# Patient Record
Sex: MALE | Race: BLACK OR AFRICAN AMERICAN | Employment: OTHER | URBAN - METROPOLITAN AREA
[De-identification: names, ages, dates, MRNs, and addresses within clinical notes are randomized per-mention and may not be internally consistent; named-entity substitution may affect disease eponyms.]

---

## 2017-04-13 ENCOUNTER — OFFICE VISIT (OUTPATIENT)
Dept: CARDIOLOGY CLINIC | Age: 67
End: 2017-04-13

## 2017-04-13 VITALS
HEART RATE: 62 BPM | BODY MASS INDEX: 30.93 KG/M2 | DIASTOLIC BLOOD PRESSURE: 65 MMHG | SYSTOLIC BLOOD PRESSURE: 131 MMHG | HEIGHT: 76 IN | WEIGHT: 254 LBS

## 2017-04-13 DIAGNOSIS — E78.00 PURE HYPERCHOLESTEROLEMIA: ICD-10-CM

## 2017-04-13 DIAGNOSIS — Z95.5 HISTORY OF CORONARY ARTERY STENT PLACEMENT: ICD-10-CM

## 2017-04-13 DIAGNOSIS — I25.10 ATHEROSCLEROSIS OF NATIVE CORONARY ARTERY OF NATIVE HEART WITHOUT ANGINA PECTORIS: Primary | ICD-10-CM

## 2017-04-13 DIAGNOSIS — I10 ESSENTIAL HYPERTENSION, BENIGN: ICD-10-CM

## 2017-04-13 DIAGNOSIS — R60.9 EDEMA, UNSPECIFIED TYPE: ICD-10-CM

## 2017-04-13 DIAGNOSIS — I25.2 OLD MYOCARDIAL INFARCTION: ICD-10-CM

## 2017-04-13 RX ORDER — PRAVASTATIN SODIUM 40 MG/1
40 TABLET ORAL
COMMUNITY
End: 2018-02-02 | Stop reason: ALTCHOICE

## 2017-04-13 NOTE — LETTER
Brittanie Diggs. 
1950 2017 Dear Brianna Garcia MD 
 
I had the pleasure of evaluating  Mr. Danielle Winchester in office today. Below are the relevant portions of my assessment and plan of care. ICD-10-CM ICD-9-CM 1. Atherosclerosis of native coronary artery of native heart without angina pectoris I25.10 414.01   
  no angina;   
2. Essential hypertension, benign I10 401.1  controlled; 10/16 high today 3. Pure hypercholesterolemia E78.00 272.0 pravastatin (PRAVACHOL) 40 mg tablet 3/17 LDL41; 9/15 [id number];   
4. Old myocardial infarction I25.2 412 5. History of coronary artery stent placement Z95.5 V45.82   
 2003 RCA 6. Edema, unspecified type R60.9 782.3 Comp Stocking, Knee,Long,Small (T.E.D. KNEE LENGTH-M-LONG) misc 2017 likely venous insufficiency. Try knee-high teds Current Outpatient Prescriptions Medication Sig Dispense Refill  pravastatin (PRAVACHOL) 40 mg tablet Take 40 mg by mouth nightly.  Comp Stocking, Knee,Long,Small (T.E.D. KNEE LENGTH-M-LONG) misc 2 Each by Does Not Apply route daily. 2 Each 0  
 amLODIPine-benazepril (LOTREL) 10-40 mg per capsule Take 1 Cap by mouth daily. 30 Cap 6  hydrochlorothiazide (HYDRODIURIL) 12.5 mg tablet Take 1 Tab by mouth every Monday and Thursday. 30 Tab 3  
 loratadine (CLARITIN) 10 mg tablet Take 10 mg by mouth.  OMEGA-3 FATTY ACIDS/FISH OIL (OMEGA 3 FISH OIL PO) Take  by mouth.  GARLIC PO Take 00,960 mg by mouth daily.  metFORMIN (GLUCOPHAGE) 1,000 mg tablet Take 1,000 mg by mouth two (2) times daily (with meals).  buffered aspirin (BUFFERIN) 325 mg tablet Take 325 mg by mouth daily. Orders Placed This Encounter  pravastatin (PRAVACHOL) 40 mg tablet Sig: Take 40 mg by mouth nightly.  Comp Stocking, Knee,Long,Small (T.E.D. KNEE LENGTH-M-LONG) misc Si Each by Does Not Apply route daily. Dispense:  2 Each   Refill:  0  
 
 If you have questions, please do not hesitate to call me. I look forward to following Mr. Katyh Siddiqui along with you. Sincerely, Claudette Rosales MD

## 2017-04-13 NOTE — MR AVS SNAPSHOT
Visit Information Date & Time Provider Department Dept. Phone Encounter #  
 4/13/2017  8:15 AM Danny Her MD [facility] [address] [place] [address] [id number] Follow-up Instructions Return in about 1 year (around 4/13/2018), or if symptoms worsen or fail to improve. Your Appointments 4/12/2018  8:00 AM  
ESTABLISHED PATIENT with Danny Her MD  
[facility]) Appt Note: 1 yr  
 [address] [facility], [address] [facility] [address]  
[address], [address] [place] [address] Upcoming Health Maintenance Date Due Hepatitis C Screening 1950 HEMOGLOBIN A1C Q6M 1950 LIPID PANEL Q1 1950 FOOT EXAM Q1 3/9/1961 MICROALBUMIN Q1 3/9/1961 EYE EXAM RETINAL OR DILATED Q1 3/9/1961 DTaP/Tdap/Td series (1 - Tdap) 3/9/1972 FOBT Q 1 YEAR AGE 50-75 3/9/2001 ZOSTER VACCINE AGE 60> 3/9/2011 GLAUCOMA SCREENING Q2Y 3/9/2016 Pneumococcal 65+ Low/Medium Risk (1 of 2 - PCV13) 3/9/2016 MEDICARE YEARLY EXAM 3/9/2016 INFLUENZA AGE 9 TO ADULT 8/1/2016 Allergies as of 4/13/2017  Review Complete On: 4/13/2017 By: Danny Her MD  
  
 Severity Noted Reaction Type Reactions Plavix [Clopidogrel]    Unknown (comments) Current Immunizations  Never Reviewed No immunizations on file. Not reviewed this visit You Were Diagnosed With   
  
 Codes Comments Atherosclerosis of native coronary artery of native heart without angina pectoris    -  Primary ICD-10-CM: I25.10 ICD-9-CM: 414.01 4/17 no angina;   
 Essential hypertension, benign     ICD-10-CM: I10 
ICD-9-CM: 401.1 4/17 controlled; 10/16 high today Pure hypercholesterolemia     ICD-10-CM: E78.00 ICD-9-CM: 272.0 3/17 LDL41; 9/15 RIU661; Old myocardial infarction     ICD-10-CM: I25.2 ICD-9-CM: 923 History of coronary artery stent placement     ICD-10-CM: Z95.5 ICD-9-CM: V45.82  RCA Edema, unspecified type     ICD-10-CM: R60.9 ICD-9-CM: 782.3 2017 likely venous insufficiency. Try knee-high teds Vitals BP Pulse Height(growth percentile) Weight(growth percentile) BMI Smoking Status 131/65 62 6' 4\" (1.93 m) 254 lb (115.2 kg) 30.92 kg/m2 Never Smoker Vitals History BMI and BSA Data Body Mass Index Body Surface Area 30.92 kg/m 2 2.49 m 2 Preferred Pharmacy Pharmacy Name Phone [facility]  [facility], [address] [phone] Your Updated Medication List  
  
   
This list is accurate as of: 17  9:21 AM.  Always use your most recent med list. amLODIPine-benazepril 10-40 mg per capsule Commonly known as:  Monda Standing Take 1 Cap by mouth daily. buffered aspirin 325 mg tablet Commonly known as:  BUFFERIN Take 325 mg by mouth daily. CLARITIN 10 mg tablet Generic drug:  loratadine Take 10 mg by mouth. Comp Stocking,  [facility] Commonly known as:  T.E.D. KNEE LENGTH-M-LONG  
2 Each by Does Not Apply route daily. GARLIC PO Take 15,000 mg by mouth daily. hydroCHLOROthiazide 12.5 mg tablet Commonly known as:  HYDRODIURIL Take 1 Tab by mouth every Monday and Thursday. metFORMIN 1,000 mg tablet Commonly known as:  GLUCOPHAGE Take 1,000 mg by mouth two (2) times daily (with meals). OMEGA 3 FISH OIL PO Take  by mouth. PRAVACHOL 40 mg tablet Generic drug:  pravastatin Take 40 mg by mouth nightly. Prescriptions Sent to Pharmacy Refills Comp Stocking, Knee,Long,Small (T.E.D. KNEE LENGTH-M-LONG) misc 0 Si Each by Does Not Apply route daily. Class: Normal  
 Pharmacy: [facility]  [facility], [address] Ph #: [phone] Route: Does Not Apply Follow-up Instructions  Return in about 1 year (around 2018), or if symptoms worsen or fail to improve. Patient Instructions Medications Discontinued During This Encounter Medication Reason  atorvastatin (LIPITOR) 40 mg tablet Alternate Therapy Orders Placed This Encounter  Comp Stocking, Knee,Long,Small (T.E.D. KNEE LENGTH-M-LONG) misc Si Each by Does Not Apply route daily. Dispense:  2 Each Refill:  0 Learning About Coronary Artery Disease (CAD) What is coronary artery disease? Coronary artery disease (CAD) occurs when plaque builds up in the arteries that bring oxygen-rich blood to your heart. Plaque is a fatty substance made of cholesterol, calcium, and other substances in the blood. This process is called hardening of the arteries, or atherosclerosis. What happens when you have coronary artery disease? · Plaque may narrow the coronary arteries. Narrowed arteries cause poor blood flow. This can lead to angina symptoms such as chest pain or discomfort. If blood flow is completely blocked, you could have a heart attack. · You can slow CAD and reduce the risk of future problems by making changes in your lifestyle. These include quitting smoking and eating heart-healthy foods. · Treatments for CAD, along with changes in your lifestyle, can help you live a longer and healthier life. How can you prevent coronary artery disease? · Do not smoke. It may be the best thing you can do to prevent heart disease. If you need help quitting, talk to your doctor about stop-smoking programs and medicines. These can increase your chances of quitting for good. · Be active. Get at least 30 minutes of exercise on most days of the week. Walking is a good choice. You also may want to do other activities, such as running, swimming, cycling, or playing tennis or team sports. · Eat heart-healthy foods. Eat more fruits and vegetables and less foods that contain saturated and trans fats. Limit alcohol, sodium, and sweets. · Stay at a healthy weight. Lose weight if you need to. · Manage other health problems such as diabetes, high blood pressure, and high cholesterol. · Manage stress. Stress can hurt your heart. To keep stress low, talk about your problems and feelings. Don't keep your feelings hidden. · If you have talked about it with your doctor, take a low-dose aspirin every day. Aspirin can help certain people lower their risk of a heart attack or stroke. But taking aspirin isn't right for everyone, because it can cause serious bleeding. Do not start taking daily aspirin unless your doctor knows about it. How is coronary artery disease treated? · Your doctor will suggest that you make lifestyle changes. For example, your doctor may ask you to eat healthy foods, quit smoking, lose extra weight, and be more active. · You will have to take medicines. · Your doctor may suggest a procedure to open narrowed or blocked arteries. This is called angioplasty. Or your doctor may suggest using healthy blood vessels to create detours around narrowed or blocked arteries. This is called bypass surgery. Follow-up care is a key part of your treatment and safety. Be sure to make and go to all appointments, and call your doctor if you are having problems. It's also a good idea to know your test results and keep a list of the medicines you take. Where can you learn more? Go to http://ave-kristofer.info/. Enter [phone] in the search box to learn more about \"Learning About Coronary Artery Disease (CAD). \" Current as of: January 27, 2016 Content Version: 11.2 © 7515-0553 [employer]. Care instructions adapted under license by [employer] (which disclaims liability or warranty for this information). If you have questions about a medical condition or this instruction, always ask your healthcare professional. [address] any warranty or liability for your use of this information. Introducing [facility]! Lesly Meaghan introduces [employer] patient portal. Now you can access parts of your medical record, email your doctor's office, and request medication refills online. 1. In your internet browser, go to [email or web address] 2. Click on the First Time User? Click Here link in the Sign In box. You will see the New Member Sign Up page. 3. Enter your [employer] Access Code exactly as it appears below. You will not need to use this code after youve completed the sign-up process. If you do not sign up before the expiration date, you must request a new code. · [employer] Access Code: [email or web address] Expires: 7/12/2017  8:46 AM 
 
4. Enter the last four digits of your Social Security Number (xxxx) and Date of Birth (mm/dd/yyyy) as indicated and click Submit. You will be taken to the next sign-up page. 5. Create a [employer] ID. This will be your [employer] login ID and cannot be changed, so think of one that is secure and easy to remember. 6. Create a [employer] password. You can change your password at any time. 7. Enter your Password Reset Question and Answer. This can be used at a later time if you forget your password. 8. Enter your e-mail address. You will receive e-mail notification when new information is available in [address] 9. Click Sign Up. You can now view and download portions of your medical record. 10. Click the Download Summary menu link to download a portable copy of your medical information. If you have questions, please visit the Frequently Asked Questions section of the [employer] website. Remember, [employer] is NOT to be used for urgent needs. For medical emergencies, dial 911. Now available from your iPhone and Android! Please provide this summary of care documentation to your next provider. Your primary care clinician is listed as Jem Cesar. If you have any questions after today's visit, please call [phone].

## 2017-04-13 NOTE — PROGRESS NOTES
1. Have you been to the ER, urgent care clinic since your last visit? Hospitalized since your last visit? NO    2. Have you seen or consulted any other health care providers outside of the [address] [place] [address] since your last visit? Include any pap smears or colon screening. No     3. Since your last visit, have you had any of the following symptoms? None    4. Have you had any blood work, X-rays or cardiac testing? Lipid and LFT scanned in media 7/3/16    5. Where do you normally have your labs drawn?  [facility]     6. Do you need any refills today?  no      Medications confirmed by patient verbally

## 2017-04-13 NOTE — PROGRESS NOTES
HISTORY OF PRESENT ILLNESS  Sameer Pina is a 78 y.o. male. HPI Comments: Follow-up of CAD, hypertension, hyperlipidemia and obesity. History of old PCI. Patient denies significant chest pain, SOB, palpitations, edema, dizziness      Hypertension   The history is provided by the medical records. This is a chronic problem. Cholesterol Problem   The history is provided by the medical records. This is a chronic problem. Review of Systems   Constitutional: Negative for chills, fever, malaise/fatigue and weight loss. HENT: Negative for nosebleeds. Eyes: Negative for discharge. Respiratory: Negative for cough and wheezing. Cardiovascular: Negative for palpitations, orthopnea, claudication, leg swelling and PND. Gastrointestinal: Negative for diarrhea, nausea and vomiting. Genitourinary: Negative for dysuria and hematuria. Musculoskeletal: Negative for joint pain. Skin: Negative for rash. Neurological: Negative for dizziness, seizures and loss of consciousness. Endo/Heme/Allergies: Negative for polydipsia. Does not bruise/bleed easily. Psychiatric/Behavioral: Negative for depression and substance abuse. The patient does not have insomnia.       Allergies   Allergen Reactions    Plavix [Clopidogrel] Unknown (comments)       Past Medical History:   Diagnosis Date    CAD (coronary artery disease)     Coronary atherosclerosis of native coronary artery     Essential hypertension     Essential hypertension, benign     Obesity, unspecified     Other and unspecified hyperlipidemia     Type II or unspecified type diabetes mellitus without mention of complication, not stated as uncontrolled        Family History   Problem Relation Age of Onset    Heart Attack Neg Hx     Stroke Neg Hx        Social History   Substance Use Topics    Smoking status: Never Smoker    Smokeless tobacco: Never Used    Alcohol use No        Current Outpatient Prescriptions   Medication Sig    pravastatin (PRAVACHOL) 40 mg tablet Take 40 mg by mouth nightly.  amLODIPine-benazepril (LOTREL) 10-40 mg per capsule Take 1 Cap by mouth daily.  hydrochlorothiazide (HYDRODIURIL) 12.5 mg tablet Take 1 Tab by mouth every Monday and Thursday.  loratadine (CLARITIN) 10 mg tablet Take 10 mg by mouth.  OMEGA-3 FATTY ACIDS/FISH OIL (OMEGA 3 FISH OIL PO) Take  by mouth.  GARLIC PO Take 83,217 mg by mouth daily.  metFORMIN (GLUCOPHAGE) 1,000 mg tablet Take 1,000 mg by mouth two (2) times daily (with meals).  buffered aspirin (BUFFERIN) 325 mg tablet Take 325 mg by mouth daily. No current facility-administered medications for this visit. Past Surgical History:   Procedure Laterality Date    CARDIAC SURG PROCEDURE UNLIST      HX CORONARY STENT PLACEMENT  2003       Diagnostic Studies:  CARDIOLOGY STUDIES 4/25/2014 12/9/2008   Treadmill Stress Test Result - WNL   Exercise Nuclear Stress Test Result nl scan, 50%EF -       Visit Vitals    /65    Pulse 62    Ht 6' 4\" (1.93 m)    Wt 115.2 kg (254 lb)    BMI 30.92 kg/m2       Mr. Olinda Jimenez has a reminder for a \"due or due soon\" health maintenance. I have asked that he contact his primary care provider for follow-up on this health maintenance. Physical Exam   Constitutional: He is oriented to person, place, and time. He appears well-developed and well-nourished. No distress. HENT:   Head: Normocephalic and atraumatic. Mouth/Throat: Normal dentition. Eyes: Right eye exhibits no discharge. Left eye exhibits no discharge. No scleral icterus. Neck: Neck supple. No JVD present. Carotid bruit is not present. No thyromegaly present. Cardiovascular: Normal rate, regular rhythm, S1 normal, S2 normal, normal heart sounds and intact distal pulses. Exam reveals no gallop and no friction rub. No murmur heard. Pulmonary/Chest: Effort normal and breath sounds normal. He has no wheezes. He has no rales. Abdominal: Soft.  He exhibits no mass. There is no tenderness. Musculoskeletal: He exhibits edema (trace). Lymphadenopathy:        Right cervical: No superficial cervical adenopathy present. Left cervical: No superficial cervical adenopathy present. Neurological: He is alert and oriented to person, place, and time. Skin: Skin is warm and dry. No rash noted. Psychiatric: He has a normal mood and affect. His behavior is normal.       ASSESSMENT and PLAN          Vanessa Romero was seen today for coronary artery disease, hypertension and cholesterol problem. Diagnoses and all orders for this visit:    Atherosclerosis of native coronary artery of native heart without angina pectoris  Comments:  4/17 no angina;     Essential hypertension, benign  Comments:  4/17 controlled; 10/16 high today      Pure hypercholesterolemia  Comments:  3/17 LDL41; 9/15 [id number]; Old myocardial infarction    History of coronary artery stent placement  Comments:  2003 RCA    Edema, unspecified type  Comments:  4/13/2017 likely venous insufficiency. Try knee-high teds  Orders:  -     Comp Stocking, Knee,Long,Small (T.E.D. KNEE LENGTH-M-LONG) misc; 2 Each by Does Not Apply route daily. Pertinent laboratory and test data reviewed and discussed with patient. See patient instructions also for other medical advice given    Medications Discontinued During This Encounter   Medication Reason    atorvastatin (LIPITOR) 40 mg tablet Alternate Therapy       Follow-up Disposition:  Return in about 1 year (around 4/13/2018), or if symptoms worsen or fail to improve.

## 2017-05-03 DIAGNOSIS — I10 ESSENTIAL HYPERTENSION, BENIGN: Primary | ICD-10-CM

## 2017-05-03 RX ORDER — HYDROCHLOROTHIAZIDE 12.5 MG/1
TABLET ORAL
Qty: 30 TAB | Refills: 0 | Status: SHIPPED | OUTPATIENT
Start: 2017-05-03 | End: 2018-02-02 | Stop reason: SDDI

## 2017-05-10 ENCOUNTER — HOSPITAL ENCOUNTER (OUTPATIENT)
Dept: LAB | Age: 67
Discharge: HOME | End: 2017-05-10
Payer: MEDICARE

## 2017-05-10 DIAGNOSIS — I10 ESSENTIAL HYPERTENSION, BENIGN: ICD-10-CM

## 2017-05-10 LAB
ANION GAP BLD CALC-SCNC: 5 MMOL/L (ref 3–18)
BUN SERPL-MCNC: 12 MG/DL (ref 7–18)
BUN/CREAT SERPL: 13 (ref 12–20)
CALCIUM SERPL-MCNC: 8.9 MG/DL (ref 8.5–10.1)
CHLORIDE SERPL-SCNC: 104 MMOL/L (ref 100–108)
CO2 SERPL-SCNC: 32 MMOL/L (ref 21–32)
CREAT SERPL-MCNC: 0.96 MG/DL (ref 0.6–1.3)
GLUCOSE SERPL-MCNC: 111 MG/DL (ref 74–99)
POTASSIUM SERPL-SCNC: 3.9 MMOL/L (ref 3.5–5.5)
SODIUM SERPL-SCNC: 141 MMOL/L (ref 136–145)

## 2017-05-10 PROCEDURE — 80048 BASIC METABOLIC PNL TOTAL CA: CPT | Performed by: INTERNAL MEDICINE

## 2017-05-10 PROCEDURE — 36415 COLL VENOUS BLD VENIPUNCTURE: CPT | Performed by: INTERNAL MEDICINE

## 2018-02-02 ENCOUNTER — OFFICE VISIT (OUTPATIENT)
Dept: CARDIOLOGY CLINIC | Age: 68
End: 2018-02-02

## 2018-02-02 VITALS
HEART RATE: 65 BPM | SYSTOLIC BLOOD PRESSURE: 146 MMHG | BODY MASS INDEX: 31.42 KG/M2 | DIASTOLIC BLOOD PRESSURE: 75 MMHG | HEIGHT: 76 IN | WEIGHT: 258 LBS

## 2018-02-02 DIAGNOSIS — I25.10 ATHEROSCLEROSIS OF NATIVE CORONARY ARTERY OF NATIVE HEART WITHOUT ANGINA PECTORIS: Primary | ICD-10-CM

## 2018-02-02 DIAGNOSIS — I25.2 OLD MYOCARDIAL INFARCTION: ICD-10-CM

## 2018-02-02 DIAGNOSIS — Z01.810 PREOP CARDIOVASCULAR EXAM: ICD-10-CM

## 2018-02-02 DIAGNOSIS — I10 ESSENTIAL HYPERTENSION, BENIGN: ICD-10-CM

## 2018-02-02 DIAGNOSIS — E66.9 OBESITY (BMI 30.0-34.9): ICD-10-CM

## 2018-02-02 DIAGNOSIS — E78.00 PURE HYPERCHOLESTEROLEMIA: ICD-10-CM

## 2018-02-02 DIAGNOSIS — Z95.5 HISTORY OF CORONARY ARTERY STENT PLACEMENT: ICD-10-CM

## 2018-02-02 RX ORDER — HYDROCHLOROTHIAZIDE 12.5 MG/1
TABLET ORAL
Qty: 30 TAB | Refills: 3 | Status: SHIPPED | OUTPATIENT
Start: 2018-02-02 | End: 2019-02-15 | Stop reason: SDUPTHER

## 2018-02-02 RX ORDER — ATORVASTATIN CALCIUM 40 MG/1
40 TABLET, FILM COATED ORAL DAILY
Qty: 30 TAB | Refills: 5 | Status: SHIPPED | OUTPATIENT
Start: 2018-02-02 | End: 2018-02-26 | Stop reason: SDUPTHER

## 2018-02-02 NOTE — PROGRESS NOTES
HISTORY OF PRESENT ILLNESS  Todd Bullard is a 78 y.o. male. HPI Comments: Follow-up of CAD, hypertension, hyperlipidemia and obesity. History of old PCI. Patient denies significant chest pain, SOB, palpitations, edema, dizziness      Pre-op Exam     Hypertension   The history is provided by the medical records. This is a chronic problem. Cholesterol Problem   The history is provided by the medical records. This is a chronic problem. Review of Systems   Constitutional: Negative for chills, fever, malaise/fatigue and weight loss. HENT: Negative for nosebleeds. Eyes: Negative for discharge. Respiratory: Negative for cough and wheezing. Cardiovascular: Negative for palpitations, orthopnea, claudication, leg swelling and PND. Gastrointestinal: Negative for diarrhea, nausea and vomiting. Genitourinary: Negative for dysuria and hematuria. Musculoskeletal: Negative for joint pain. Skin: Negative for rash. Neurological: Negative for dizziness, seizures and loss of consciousness. Endo/Heme/Allergies: Negative for polydipsia. Does not bruise/bleed easily. Psychiatric/Behavioral: Negative for depression and substance abuse. The patient does not have insomnia.       Allergies   Allergen Reactions    Plavix [Clopidogrel] Unknown (comments)       Past Medical History:   Diagnosis Date    CAD (coronary artery disease)     Coronary atherosclerosis of native coronary artery     Essential hypertension     Essential hypertension, benign     Obesity, unspecified     Other and unspecified hyperlipidemia     Type II or unspecified type diabetes mellitus without mention of complication, not stated as uncontrolled        Family History   Problem Relation Age of Onset    Heart Attack Neg Hx     Stroke Neg Hx        Social History   Substance Use Topics    Smoking status: Never Smoker    Smokeless tobacco: Never Used    Alcohol use No        Current Outpatient Prescriptions   Medication Sig    amLODIPine-benazepril (LOTREL) 10-40 mg per capsule TAKE ONE CAPSULE BY MOUTH ONCE DAILY    pravastatin (PRAVACHOL) 40 mg tablet Take 40 mg by mouth nightly.  loratadine (CLARITIN) 10 mg tablet Take 10 mg by mouth.  OMEGA-3 FATTY ACIDS/FISH OIL (OMEGA 3 FISH OIL PO) Take  by mouth.  GARLIC PO Take 70,324 mg by mouth daily.  metFORMIN (GLUCOPHAGE) 1,000 mg tablet Take 1,000 mg by mouth two (2) times daily (with meals).  buffered aspirin (BUFFERIN) 325 mg tablet Take 325 mg by mouth daily.  Comp Stocking, Knee,Long,Small (T.E.D. KNEE LENGTH-M-LONG) misc 2 Each by Does Not Apply route daily. No current facility-administered medications for this visit. Past Surgical History:   Procedure Laterality Date    CARDIAC SURG PROCEDURE UNLIST      HX CORONARY STENT PLACEMENT  2003       Diagnostic Studies:  CARDIOLOGY STUDIES 4/25/2014 12/9/2008   Treadmill Stress Test Result - WNL   Exercise Nuclear Stress Test Result nl scan, 50%EF -   Some recent data might be hidden       Visit Vitals    /75    Pulse 65    Ht 6' 4\" (1.93 m)    Wt 258 lb (117 kg)    BMI 31.4 kg/m2       Mr. Micheline Jensen has a reminder for a \"due or due soon\" health maintenance. I have asked that he contact his primary care provider for follow-up on this health maintenance. Physical Exam   Constitutional: He is oriented to person, place, and time. He appears well-developed and well-nourished. No distress. HENT:   Head: Normocephalic and atraumatic. Mouth/Throat: Normal dentition. Eyes: Right eye exhibits no discharge. Left eye exhibits no discharge. No scleral icterus. Neck: Neck supple. No JVD present. Carotid bruit is not present. No thyromegaly present. Cardiovascular: Normal rate, regular rhythm, S1 normal, S2 normal, normal heart sounds and intact distal pulses. Exam reveals no gallop and no friction rub. No murmur heard.   Pulmonary/Chest: Effort normal and breath sounds normal. He has no wheezes. He has no rales. Abdominal: Soft. He exhibits no mass. There is no tenderness. Musculoskeletal: He exhibits no edema. Lymphadenopathy:        Right cervical: No superficial cervical adenopathy present. Left cervical: No superficial cervical adenopathy present. Neurological: He is alert and oriented to person, place, and time. Skin: Skin is warm and dry. No rash noted. Psychiatric: He has a normal mood and affect. His behavior is normal.       ASSESSMENT and PLAN        Diagnoses and all orders for this visit:    1. Atherosclerosis of native coronary artery of native heart without angina pectoris  Comments:  2/18; 4/17 no angina;   10/16 stable symptoms  2003 LCx stent; 100% RCA      2. Essential hypertension, benign  Comments:  2/18 represcribe HCTZ 2/wk for HTN  Orders:  -     AMB POC EKG ROUTINE W/ 12 LEADS, INTER & REP  -     hydroCHLOROthiazide (HYDRODIURIL) 12.5 mg tablet; TAKE ONE TABLET BY MOUTH EVERY MONDAY AND THURSDAY (GENERIC FOR HYDRODIURIL)  Indications: hypertension    3. Pure hypercholesterolemia  Comments:  2/18 LDL73; 3/17 UKW67; 9/15 QAV186;   Orders:  -     atorvastatin (LIPITOR) 40 mg tablet; Take 1 Tab by mouth daily.  -     LIPID PANEL; Future  -     HEPATIC FUNCTION PANEL; Future    4. Old myocardial infarction    5. Obesity (BMI 30.0-34. 9)  Comments:  Weight loss has been strongly encouraged by following dietary restrictions and an exercise routine. 6. History of coronary artery stent placement  Comments:  2003 RCA      7. Preop cardiovascular exam  Comments:  Cataract  Cleared from cardiac view with the understanding that patient will have mild risk for this surgery. Pertinent laboratory and test data reviewed and discussed with patient.   See patient instructions also for other medical advice given    Medications Discontinued During This Encounter   Medication Reason    hydroCHLOROthiazide (HYDRODIURIL) 12.5 mg tablet Non-Compliance    pravastatin (PRAVACHOL) 40 mg tablet Alternate Therapy       Follow-up Disposition:  Return in about 6 months (around 8/2/2018), or if symptoms worsen or fail to improve, for post test.

## 2018-02-02 NOTE — PATIENT INSTRUCTIONS
Medications Discontinued During This Encounter   Medication Reason    hydroCHLOROthiazide (HYDRODIURIL) 12.5 mg tablet Non-Compliance    pravastatin (PRAVACHOL) 40 mg tablet Alternate Therapy       Orders Placed This Encounter    LIPID PANEL     Standing Status:   Future     Standing Expiration Date:   8/5/2018    HEPATIC FUNCTION PANEL     Standing Status:   Future     Standing Expiration Date:   8/5/2018    AMB POC EKG ROUTINE W/ 12 LEADS, INTER & REP     Order Specific Question:   Reason for Exam:     Answer:   htn    atorvastatin (LIPITOR) 40 mg tablet     Sig: Take 1 Tab by mouth daily. Dispense:  30 Tab     Refill:  5    hydroCHLOROthiazide (HYDRODIURIL) 12.5 mg tablet     Sig: TAKE ONE TABLET BY MOUTH EVERY MONDAY AND THURSDAY (GENERIC FOR HYDRODIURIL)  Indications: hypertension     Dispense:  30 Tab     Refill:  3          Body Mass Index: Care Instructions  Your Care Instructions    Body mass index (BMI) can help you see if your weight is raising your risk for health problems. It uses a formula to compare how much you weigh with how tall you are. · A BMI lower than 18.5 is considered underweight. · A BMI between 18.5 and 24.9 is considered healthy. · A BMI between 25 and 29.9 is considered overweight. A BMI of 30 or higher is considered obese. If your BMI is in the normal range, it means that you have a lower risk for weight-related health problems. If your BMI is in the overweight or obese range, you may be at increased risk for weight-related health problems, such as high blood pressure, heart disease, stroke, arthritis or joint pain, and diabetes. If your BMI is in the underweight range, you may be at increased risk for health problems such as fatigue, lower protection (immunity) against illness, muscle loss, bone loss, hair loss, and hormone problems. BMI is just one measure of your risk for weight-related health problems.  You may be at higher risk for health problems if you are not active, you eat an unhealthy diet, or you drink too much alcohol or use tobacco products. Follow-up care is a key part of your treatment and safety. Be sure to make and go to all appointments, and call your doctor if you are having problems. It's also a good idea to know your test results and keep a list of the medicines you take. How can you care for yourself at home? · Practice healthy eating habits. This includes eating plenty of fruits, vegetables, whole grains, lean protein, and low-fat dairy. · If your doctor recommends it, get more exercise. Walking is a good choice. Bit by bit, increase the amount you walk every day. Try for at least 30 minutes on most days of the week. · Do not smoke. Smoking can increase your risk for health problems. If you need help quitting, talk to your doctor about stop-smoking programs and medicines. These can increase your chances of quitting for good. · Limit alcohol to 2 drinks a day for men and 1 drink a day for women. Too much alcohol can cause health problems. If you have a BMI higher than 25  · Your doctor may do other tests to check your risk for weight-related health problems. This may include measuring the distance around your waist. A waist measurement of more than 40 inches in men or 35 inches in women can increase the risk of weight-related health problems. · Talk with your doctor about steps you can take to stay healthy or improve your health. You may need to make lifestyle changes to lose weight and stay healthy, such as changing your diet and getting regular exercise. If you have a BMI lower than 18.5  · Your doctor may do other tests to check your risk for health problems. · Talk with your doctor about steps you can take to stay healthy or improve your health. You may need to make lifestyle changes to gain or maintain weight and stay healthy, such as getting more healthy foods in your diet and doing exercises to build muscle. Where can you learn more?   Go to http://ave-kristofer.info/. Enter S176 in the search box to learn more about \"Body Mass Index: Care Instructions. \"  Current as of: October 13, 2016  Content Version: 11.4  © 0139-2323 Healthwise, Incorporated. Care instructions adapted under license by [employer] (which disclaims liability or warranty for this information). If you have questions about a medical condition or this instruction, always ask your healthcare professional. [address] any warranty or liability for your use of this information.

## 2018-02-02 NOTE — LETTER
Antolin Chun. 
1950 
 
2/2/2018 Dear MD Wilman Peterson MD 
 
I had the pleasure of evaluating  Mr. Mayank Lal in office today. Below are the relevant portions of my assessment and plan of care. ICD-10-CM ICD-9-CM 1. Atherosclerosis of native coronary artery of native heart without angina pectoris I25.10 414.01   
 2/18; 4/17 no angina;  
10/16 stable symptoms 2003 LCx stent; 100% RCA 2. Essential hypertension, benign I10 401.1 AMB POC EKG ROUTINE W/ 12 LEADS, INTER & REP  
   hydroCHLOROthiazide (HYDRODIURIL) 12.5 mg tablet 2/18 represcribe HCTZ 2/wk for HTN 3. Pure hypercholesterolemia E78.00 272.0 atorvastatin (LIPITOR) 40 mg tablet LIPID PANEL  
   HEPATIC FUNCTION PANEL  
 2/18 LDL73; 3/17 LDL41; 9/15 [id number];   
4. Old myocardial infarction I25.2 412   
5. Obesity (BMI 30.0-34. 9) E66.9 278.00 Weight loss has been strongly encouraged by following dietary restrictions and an exercise routine. 6. History of coronary artery stent placement Z95.5 V45.82   
 2003 RCA 7. Preop cardiovascular exam Z01.810 V72.81 Cataract Cleared from cardiac view with the understanding that patient will have mild risk for this surgery. Current Outpatient Prescriptions Medication Sig Dispense Refill  atorvastatin (LIPITOR) 40 mg tablet Take 1 Tab by mouth daily. 30 Tab 5  hydroCHLOROthiazide (HYDRODIURIL) 12.5 mg tablet TAKE ONE TABLET BY MOUTH EVERY MONDAY AND THURSDAY (GENERIC FOR HYDRODIURIL)  Indications: hypertension 30 Tab 3  
 amLODIPine-benazepril (LOTREL) 10-40 mg per capsule TAKE ONE CAPSULE BY MOUTH ONCE DAILY 30 Cap 6  loratadine (CLARITIN) 10 mg tablet Take 10 mg by mouth.  OMEGA-3 FATTY ACIDS/FISH OIL (OMEGA 3 FISH OIL PO) Take  by mouth.  GARLIC PO Take 84,789 mg by mouth daily.  metFORMIN (GLUCOPHAGE) 1,000 mg tablet Take 1,000 mg by mouth two (2) times daily (with meals).  buffered aspirin (BUFFERIN) 325 mg tablet Take 325 mg by mouth daily.  Comp Stocking, Knee,Long,Small (T.E.D. KNEE LENGTH-M-LONG) misc 2 Each by Does Not Apply route daily. 2 Each 0 Orders Placed This Encounter  LIPID PANEL Standing Status:   Future Standing Expiration Date:   8/5/2018  
 HEPATIC FUNCTION PANEL Standing Status:   Future Standing Expiration Date:   8/5/2018  AMB POC EKG ROUTINE W/ 12 LEADS, INTER & REP Order Specific Question:   Reason for Exam: Answer:   htn  atorvastatin (LIPITOR) 40 mg tablet Sig: Take 1 Tab by mouth daily. Dispense:  30 Tab Refill:  5  
 hydroCHLOROthiazide (HYDRODIURIL) 12.5 mg tablet Sig: TAKE ONE TABLET BY MOUTH EVERY MONDAY AND THURSDAY (GENERIC FOR HYDRODIURIL)  Indications: hypertension Dispense:  30 Tab Refill:  3 If you have questions, please do not hesitate to call me. I look forward to following Mr. Jenaro Wynne along with you. Sincerely, Elian Martinez MD

## 2018-02-02 NOTE — MR AVS SNAPSHOT
[address] [facility] 
 
 
 [address] [facility] [place], [address] [facility] [address] 
[phone] Patient: Zenaida Rose. MRN: [id number]:[id number] Visit Information Date & Time Provider Department Dept. Phone Encounter #  
 2/2/2018  9:30 AM Simone Bray MD [facility] [address] [place] [address] [id number] Follow-up Instructions Return in about 6 months (around 8/2/2018), or if symptoms worsen or fail to improve, for post test.  
  
Your Appointments 8/17/2018  8:00 AM  
ESTABLISHED PATIENT with Simone Bray MD  
[facility]) Appt Note: 6 months post labs [address] [address] [place], [address] [facility] [address]  
[address], [address] [facility] [address] [place] Upcoming Health Maintenance Date Due Hepatitis C Screening 1950 HEMOGLOBIN A1C Q6M 1950 LIPID PANEL Q1 1950 FOOT EXAM Q1 3/9/1961 MICROALBUMIN Q1 3/9/1961 EYE EXAM RETINAL OR DILATED Q1 3/9/1961 DTaP/Tdap/Td series (1 - Tdap) 3/9/1972 FOBT Q 1 YEAR AGE 50-75 3/9/2001 ZOSTER VACCINE AGE 60> 1/9/2011 GLAUCOMA SCREENING Q2Y 3/9/2016 Pneumococcal 65+ Low/Medium Risk (1 of 2 - PCV13) 3/9/2016 MEDICARE YEARLY EXAM 3/9/2016 Influenza Age 5 to Adult 8/1/2017 Allergies as of 2/2/2018  Review Complete On: 2/2/2018 By: Simone Bray MD  
  
 Severity Noted Reaction Type Reactions Plavix [Clopidogrel]    Unknown (comments) Current Immunizations  Never Reviewed No immunizations on file. Not reviewed this visit You Were Diagnosed With   
  
 Codes Comments Atherosclerosis of native coronary artery of native heart without angina pectoris    -  Primary ICD-10-CM: I25.10 ICD-9-CM: 414.01 2/18; 4/17 no angina;  
10/16 stable symptoms 2003 LCx stent; 100% RCA  Essential hypertension, benign     ICD-10-CM: I10 
ICD-9-CM: 401.1 2/18 represcribe HCTZ 2/wk for HTN  
 Pure hypercholesterolemia     ICD-10-CM: E78.00 ICD-9-CM: 272.0 2/18 LDL73; 3/17 QRD23; 9/15 [id number]; Old myocardial infarction     ICD-10-CM: I25.2 ICD-9-CM: 600 Obesity (BMI 30.0-34.9)     ICD-10-CM: E74.6 ICD-9-CM: 278.00 Weight loss has been strongly encouraged by following dietary restrictions and an exercise routine. History of coronary artery stent placement     ICD-10-CM: Z95.5 ICD-9-CM: V45.82 2003 RCA Preop cardiovascular exam     ICD-10-CM: Z01.810 ICD-9-CM: V72.81 Cataract Cleared from cardiac view with the understanding that patient will have mild risk for this surgery. Vitals BP Pulse Height(growth percentile) Weight(growth percentile) BMI Smoking Status 146/75 65 6' 4\" (1.93 m) 258 lb (117 kg) 31.4 kg/m2 Never Smoker Vitals History BMI and BSA Data Body Mass Index Body Surface Area  
 31.4 kg/m 2 2.5 m 2 Preferred Pharmacy Pharmacy Name Phone [address], [address]  [phone] Your Updated Medication List  
  
   
This list is accurate as of: 2/2/18 10:25 AM.  Always use your most recent med list. amLODIPine-benazepril 10-40 mg per capsule Commonly known as:  LOTREL  
TAKE ONE CAPSULE BY MOUTH ONCE DAILY  
  
 atorvastatin 40 mg tablet Commonly known as:  LIPITOR Take 1 Tab by mouth daily. buffered aspirin 325 mg tablet Commonly known as:  BUFFERIN Take 325 mg by mouth daily. CLARITIN 10 mg tablet Generic drug:  loratadine Take 10 mg by mouth. Comp Stocking, [address] [facility] Commonly known as:  T.E.D. KNEE LENGTH-M-LONG  
2 Each by Does Not Apply route daily. GARLIC PO Take 15,000 mg by mouth daily. hydroCHLOROthiazide 12.5 mg tablet Commonly known as:  HYDRODIURIL  
TAKE ONE TABLET BY MOUTH EVERY MONDAY AND THURSDAY (GENERIC FOR HYDRODIURIL)  Indications: hypertension  
  
 metFORMIN 1,000 mg tablet Commonly known as:  GLUCOPHAGE Take 1,000 mg by mouth two (2) times daily (with meals). OMEGA 3 FISH OIL PO Take  by mouth. Prescriptions Sent to Pharmacy Refills  
 atorvastatin (LIPITOR) 40 mg tablet 5 Sig: Take 1 Tab by mouth daily. Class: Normal  
 Pharmacy: [facility] DR JACEK URRUTIA [address] [place] [address], [address] [place] [address] Ph #: [phone] Route: Oral  
 hydroCHLOROthiazide (HYDRODIURIL) 12.5 mg tablet 3 Sig: TAKE ONE TABLET BY MOUTH EVERY MONDAY AND THURSDAY (GENERIC FOR HYDRODIURIL)  Indications: hypertension Class: Normal  
 Pharmacy: [facility] DR JACEK URRUTIA [address] [place] [address], [address] [place] [address] Ph #: [phone] We Performed the Following AMB POC EKG ROUTINE W/ 12 LEADS, INTER & REP [32525 CPT(R)] Follow-up Instructions Return in about 6 months (around 8/2/2018), or if symptoms worsen or fail to improve, for post test.  
  
To-Do List   
 Around 03/09/2018 Lab:  HEPATIC FUNCTION PANEL Around 03/09/2018 Lab:  LIPID PANEL Patient Instructions Medications Discontinued During This Encounter Medication Reason  hydroCHLOROthiazide (HYDRODIURIL) 12.5 mg tablet Non-Compliance  pravastatin (PRAVACHOL) 40 mg tablet Alternate Therapy Orders Placed This Encounter  LIPID PANEL Standing Status:   Future Standing Expiration Date:   8/5/2018  
 HEPATIC FUNCTION PANEL Standing Status:   Future Standing Expiration Date:   8/5/2018  AMB POC EKG ROUTINE W/ 12 LEADS, INTER & REP Order Specific Question:   Reason for Exam: Answer:   htn  atorvastatin (LIPITOR) 40 mg tablet Sig: Take 1 Tab by mouth daily. Dispense:  30 Tab Refill:  5  
 hydroCHLOROthiazide (HYDRODIURIL) 12.5 mg tablet Sig: TAKE ONE TABLET BY MOUTH EVERY MONDAY AND THURSDAY (GENERIC FOR HYDRODIURIL)  Indications: hypertension Dispense:  30 Tab Refill:  3 Body Mass Index: Care Instructions Your Care Instructions Body mass index (BMI) can help you see if your weight is raising your risk for health problems. It uses a formula to compare how much you weigh with how tall you are. · A BMI lower than 18.5 is considered underweight. · A BMI between 18.5 and 24.9 is considered healthy. · A BMI between 25 and 29.9 is considered overweight. A BMI of 30 or higher is considered obese. If your BMI is in the normal range, it means that you have a lower risk for weight-related health problems. If your BMI is in the overweight or obese range, you may be at increased risk for weight-related health problems, such as high blood pressure, heart disease, stroke, arthritis or joint pain, and diabetes. If your BMI is in the underweight range, you may be at increased risk for health problems such as fatigue, lower protection (immunity) against illness, muscle loss, bone loss, hair loss, and hormone problems. BMI is just one measure of your risk for weight-related health problems. You may be at higher risk for health problems if you are not active, you eat an unhealthy diet, or you drink too much alcohol or use tobacco products. Follow-up care is a key part of your treatment and safety. Be sure to make and go to all appointments, and call your doctor if you are having problems. It's also a good idea to know your test results and keep a list of the medicines you take. How can you care for yourself at home? · Practice healthy eating habits. This includes eating plenty of fruits, vegetables, whole grains, lean protein, and low-fat dairy. · If your doctor recommends it, get more exercise. Walking is a good choice. Bit by bit, increase the amount you walk every day. Try for at least 30 minutes on most days of the week. · Do not smoke. Smoking can increase your risk for health problems.  If you need help quitting, talk to your doctor about stop-smoking programs and medicines. These can increase your chances of quitting for good. · Limit alcohol to 2 drinks a day for men and 1 drink a day for women. Too much alcohol can cause health problems. If you have a BMI higher than 25 · Your doctor may do other tests to check your risk for weight-related health problems. This may include measuring the distance around your waist. A waist measurement of more than 40 inches in men or 35 inches in women can increase the risk of weight-related health problems. · Talk with your doctor about steps you can take to stay healthy or improve your health. You may need to make lifestyle changes to lose weight and stay healthy, such as changing your diet and getting regular exercise. If you have a BMI lower than 18.5 · Your doctor may do other tests to check your risk for health problems. · Talk with your doctor about steps you can take to stay healthy or improve your health. You may need to make lifestyle changes to gain or maintain weight and stay healthy, such as getting more healthy foods in your diet and doing exercises to build muscle. Where can you learn more? Go to http://ave-kristofer.info/. Enter S176 in the search box to learn more about \"Body Mass Index: Care Instructions. \" Current as of: October 13, 2016 Content Version: 11.4 © 5309-8280 Healthwise, Incorporated. Care instructions adapted under license by [employer] (which disclaims liability or warranty for this information). If you have questions about a medical condition or this instruction, always ask your healthcare professional. [address] any warranty or liability for your use of this information. Introducing [facility] HE[facility]! [employer]hem[employer] Form introduces [employer] patient portal. Now you can access parts of your medical record, email your doctor's office, and request medication refills online.    
 
1. In your internet browser, go to [email or web address] 2. Click on the First Time User? Click Here link in the Sign In box. You will see the New Member Sign Up page. 3. Enter your DataParenting Access Code exactly as it appears below. You will not need to use this code after youve completed the sign-up process. If you do not sign up before the expiration date, you must request a new code. · DataParenting Access Code: [email or web address] Expires: 5/3/2018  9:33 AM 
 
4. Enter the last four digits of your Social Security Number (xxxx) and Date of Birth (mm/dd/yyyy) as indicated and click Submit. You will be taken to the next sign-up page. 5. Create a AdzCentralt ID. This will be your DataParenting login ID and cannot be changed, so think of one that is secure and easy to remember. 6. Create a DataParenting password. You can change your password at any time. 7. Enter your Password Reset Question and Answer. This can be used at a later time if you forget your password. 8. Enter your e-mail address. You will receive e-mail notification when new information is available in [address] 9. Click Sign Up. You can now view and download portions of your medical record. 10. Click the Download Summary menu link to download a portable copy of your medical information. If you have questions, please visit the Frequently Asked Questions section of the DataParenting website. Remember, DataParenting is NOT to be used for urgent needs. For medical emergencies, dial 911. Now available from your iPhone and Android! Please provide this summary of care documentation to your next provider. Your primary care clinician is listed as Tomer Cohen. If you have any questions after today's visit, please call [phone].

## 2018-02-02 NOTE — PROGRESS NOTES
Patient didn't bring medications, verbally reviewed    1. Have you been to the ER, urgent care clinic since your last visit? Hospitalized since your last visit? NO    2. Have you seen or consulted any other health care providers outside of the [address] [place] [address] since your last visit? Include any pap smears or colon screening. Yes Where: Eye, Cataract PCP Routine     3. Since your last visit, have you had any of the following symptoms? No    4. Have you had any blood work, X-rays or cardiac testing? Yes Where: PCP     Requested: NO     In [facility]: YES    5. Where do you normally have your labs drawn? PCP    6. Do you need any refills today?    No

## 2018-02-26 DIAGNOSIS — E78.00 PURE HYPERCHOLESTEROLEMIA: ICD-10-CM

## 2018-02-26 DIAGNOSIS — I10 ESSENTIAL HYPERTENSION, BENIGN: ICD-10-CM

## 2018-02-26 RX ORDER — ATORVASTATIN CALCIUM 40 MG/1
40 TABLET, FILM COATED ORAL DAILY
Qty: 30 TAB | Refills: 5 | Status: SHIPPED | OUTPATIENT
Start: 2018-02-26 | End: 2018-03-01 | Stop reason: SDUPTHER

## 2018-02-26 RX ORDER — AMLODIPINE BESYLATE AND BENAZEPRIL HYDROCHLORIDE 10; 40 MG/1; MG/1
1 CAPSULE ORAL DAILY
Qty: 30 CAP | Refills: 6 | Status: SHIPPED | OUTPATIENT
Start: 2018-02-26 | End: 2018-03-01 | Stop reason: SDUPTHER

## 2018-03-01 DIAGNOSIS — E78.00 PURE HYPERCHOLESTEROLEMIA: ICD-10-CM

## 2018-03-01 DIAGNOSIS — I10 ESSENTIAL HYPERTENSION, BENIGN: ICD-10-CM

## 2018-03-01 RX ORDER — AMLODIPINE BESYLATE AND BENAZEPRIL HYDROCHLORIDE 10; 40 MG/1; MG/1
1 CAPSULE ORAL DAILY
Qty: 90 CAP | Refills: 1 | Status: SHIPPED | OUTPATIENT
Start: 2018-03-01 | End: 2018-05-14 | Stop reason: SDUPTHER

## 2018-03-01 RX ORDER — ATORVASTATIN CALCIUM 40 MG/1
40 TABLET, FILM COATED ORAL DAILY
Qty: 90 TAB | Refills: 1 | Status: SHIPPED | OUTPATIENT
Start: 2018-03-01 | End: 2018-03-22 | Stop reason: SDUPTHER

## 2018-03-22 DIAGNOSIS — E78.00 PURE HYPERCHOLESTEROLEMIA: ICD-10-CM

## 2018-03-22 RX ORDER — ATORVASTATIN CALCIUM 40 MG/1
40 TABLET, FILM COATED ORAL DAILY
Qty: 90 TAB | Refills: 1 | Status: SHIPPED | OUTPATIENT
Start: 2018-03-22 | End: 2018-10-29 | Stop reason: SDUPTHER

## 2018-05-14 DIAGNOSIS — I10 ESSENTIAL HYPERTENSION, BENIGN: ICD-10-CM

## 2018-05-14 RX ORDER — AMLODIPINE BESYLATE AND BENAZEPRIL HYDROCHLORIDE 10; 40 MG/1; MG/1
1 CAPSULE ORAL DAILY
Qty: 90 CAP | Refills: 1 | Status: SHIPPED | OUTPATIENT
Start: 2018-05-14 | End: 2019-02-15 | Stop reason: SDUPTHER

## 2018-08-17 ENCOUNTER — OFFICE VISIT (OUTPATIENT)
Dept: CARDIOLOGY CLINIC | Age: 68
End: 2018-08-17

## 2018-08-17 VITALS
DIASTOLIC BLOOD PRESSURE: 71 MMHG | HEIGHT: 76 IN | HEART RATE: 65 BPM | WEIGHT: 245 LBS | SYSTOLIC BLOOD PRESSURE: 140 MMHG | BODY MASS INDEX: 29.83 KG/M2

## 2018-08-17 DIAGNOSIS — I10 ESSENTIAL HYPERTENSION, BENIGN: ICD-10-CM

## 2018-08-17 DIAGNOSIS — Z95.5 HISTORY OF CORONARY ARTERY STENT PLACEMENT: ICD-10-CM

## 2018-08-17 DIAGNOSIS — I25.2 OLD MYOCARDIAL INFARCTION: ICD-10-CM

## 2018-08-17 DIAGNOSIS — I25.10 ATHEROSCLEROSIS OF NATIVE CORONARY ARTERY OF NATIVE HEART WITHOUT ANGINA PECTORIS: Primary | ICD-10-CM

## 2018-08-17 DIAGNOSIS — E78.00 PURE HYPERCHOLESTEROLEMIA: ICD-10-CM

## 2018-08-17 DIAGNOSIS — E66.9 OBESITY (BMI 30.0-34.9): ICD-10-CM

## 2018-08-17 RX ORDER — DOXAZOSIN 1 MG/1
1 TABLET ORAL
Qty: 90 TAB | Refills: 1 | Status: SHIPPED | OUTPATIENT
Start: 2018-08-17 | End: 2020-10-19

## 2018-08-17 NOTE — PATIENT INSTRUCTIONS
There are no discontinued medications. Body Mass Index: Care Instructions  Your Care Instructions    Body mass index (BMI) can help you see if your weight is raising your risk for health problems. It uses a formula to compare how much you weigh with how tall you are. · A BMI lower than 18.5 is considered underweight. · A BMI between 18.5 and 24.9 is considered healthy. · A BMI between 25 and 29.9 is considered overweight. A BMI of 30 or higher is considered obese. If your BMI is in the normal range, it means that you have a lower risk for weight-related health problems. If your BMI is in the overweight or obese range, you may be at increased risk for weight-related health problems, such as high blood pressure, heart disease, stroke, arthritis or joint pain, and diabetes. If your BMI is in the underweight range, you may be at increased risk for health problems such as fatigue, lower protection (immunity) against illness, muscle loss, bone loss, hair loss, and hormone problems. BMI is just one measure of your risk for weight-related health problems. You may be at higher risk for health problems if you are not active, you eat an unhealthy diet, or you drink too much alcohol or use tobacco products. Follow-up care is a key part of your treatment and safety. Be sure to make and go to all appointments, and call your doctor if you are having problems. It's also a good idea to know your test results and keep a list of the medicines you take. How can you care for yourself at home? · Practice healthy eating habits. This includes eating plenty of fruits, vegetables, whole grains, lean protein, and low-fat dairy. · If your doctor recommends it, get more exercise. Walking is a good choice. Bit by bit, increase the amount you walk every day. Try for at least 30 minutes on most days of the week. · Do not smoke. Smoking can increase your risk for health problems.  If you need help quitting, talk to your doctor about stop-smoking programs and medicines. These can increase your chances of quitting for good. · Limit alcohol to 2 drinks a day for men and 1 drink a day for women. Too much alcohol can cause health problems. If you have a BMI higher than 25  · Your doctor may do other tests to check your risk for weight-related health problems. This may include measuring the distance around your waist. A waist measurement of more than 40 inches in men or 35 inches in women can increase the risk of weight-related health problems. · Talk with your doctor about steps you can take to stay healthy or improve your health. You may need to make lifestyle changes to lose weight and stay healthy, such as changing your diet and getting regular exercise. If you have a BMI lower than 18.5  · Your doctor may do other tests to check your risk for health problems. · Talk with your doctor about steps you can take to stay healthy or improve your health. You may need to make lifestyle changes to gain or maintain weight and stay healthy, such as getting more healthy foods in your diet and doing exercises to build muscle. Where can you learn more? Go to http://ave-kristofer.info/. Enter S176 in the search box to learn more about \"Body Mass Index: Care Instructions. \"  Current as of: October 13, 2016  Content Version: 11.4  © 5631-3880 Healthwise, Incorporated. Care instructions adapted under license by [employer] (which disclaims liability or warranty for this information). If you have questions about a medical condition or this instruction, always ask your healthcare professional. [place] [address] any warranty or liability for your use of this information.

## 2018-08-17 NOTE — MR AVS SNAPSHOT
[address] [facility] [address] 
 
 
 [address] [facility], [address] [facility] [address] 
[phone] Patient: Bravo Correia. MRN: [id number] CNU:0/0/6904 Visit Information Date & Time Provider Department Dept. Phone Encounter #  
 8/17/2018  8:00 AM Walter Skinner MD [facility] [address] [place] [address] [id number] Follow-up Instructions Return in about 6 months (around 2/17/2019), or if symptoms worsen or fail to improve, for with ekg. Your Appointments 2/15/2019  8:30 AM  
Office Visit with Walter Skinner MD  
[facility]) Appt Note: 6 months [address] [facility], [address] [facility] [address]  
[address] [facility] [address] [facility] [address] Upcoming Health Maintenance Date Due Hepatitis C Screening 1950 HEMOGLOBIN A1C Q6M 1950 LIPID PANEL Q1 1950 FOOT EXAM Q1 3/9/1961 MICROALBUMIN Q1 3/9/1961 EYE EXAM RETINAL OR DILATED Q1 3/9/1961 DTaP/Tdap/Td series (1 - Tdap) 3/9/1972 FOBT Q 1 YEAR AGE 50-75 3/9/2001 ZOSTER VACCINE AGE 60> 1/9/2011 GLAUCOMA SCREENING Q2Y 3/9/2016 Pneumococcal 65+ Low/Medium Risk (1 of 2 - PCV13) 3/9/2016 MEDICARE YEARLY EXAM 3/14/2018 Influenza Age 5 to Adult 8/1/2018 Allergies as of 8/17/2018  Review Complete On: 8/17/2018 By: Walter Skinner MD  
  
 Severity Noted Reaction Type Reactions Plavix [Clopidogrel]    Unknown (comments) Current Immunizations  Never Reviewed No immunizations on file. Not reviewed this visit You Were Diagnosed With   
  
 Codes Comments Atherosclerosis of native coronary artery of native heart without angina pectoris    -  Primary ICD-10-CM: I25.10 ICD-9-CM: 414.01 Essential hypertension, benign     ICD-10-CM: I10 
ICD-9-CM: 401.1 Pure hypercholesterolemia     ICD-10-CM: E78.00 ICD-9-CM: 272.0 Old myocardial infarction     ICD-10-CM: I25.2 ICD-9-CM: 340 History of coronary artery stent placement     ICD-10-CM: Z95.5 ICD-9-CM: V45.82 Obesity (BMI 30.0-34.9)     ICD-10-CM: N31.7 ICD-9-CM: 278.00 Vitals BP Pulse Height(growth percentile) Weight(growth percentile) BMI Smoking Status 140/71 65 6' 4\" (1.93 m) 245 lb (111.1 kg) 29.82 kg/m2 Never Smoker Vitals History BMI and BSA Data Body Mass Index Body Surface Area  
 29.82 kg/m 2 2.44 m 2 Preferred Pharmacy Pharmacy Name Phone [address] [facility], [address] [phone] Your Updated Medication List  
  
   
This list is accurate as of 8/17/18  8:44 AM.  Always use your most recent med list. amLODIPine-benazepril 10-40 mg per capsule Commonly known as:  Kathrynn Mode Take 1 Cap by mouth daily. APPLE CIDER VINEGAR PO Take  by mouth. atorvastatin 40 mg tablet Commonly known as:  LIPITOR Take 1 Tab by mouth daily. buffered aspirin 325 mg tablet Commonly known as:  BUFFERIN Take 325 mg by mouth daily. CLARITIN 10 mg tablet Generic drug:  loratadine Take 10 mg by mouth. Comp Stocking, [address] [facility] Commonly known as:  T.E.DWilliam KNEE LENGTH-M-LONG  
2 Each by Does Not Apply route daily. doxazosin 1 mg tablet Commonly known as:  CARDURA Take 1 Tab by mouth nightly. GARLIC PO Take 15,000 mg by mouth daily. hydroCHLOROthiazide 12.5 mg tablet Commonly known as:  HYDRODIURIL  
TAKE ONE TABLET BY MOUTH EVERY MONDAY AND THURSDAY (GENERIC FOR HYDRODIURIL)  Indications: hypertension  
  
 metFORMIN 1,000 mg tablet Commonly known as:  GLUCOPHAGE Take 1,000 mg by mouth two (2) times daily (with meals). OMEGA 3 FISH OIL PO Take  by mouth. Prescriptions Sent to Pharmacy Refills  
 doxazosin (CARDURA) 1 mg tablet 1 Sig: Take 1 Tab by mouth nightly.   
 Class: Normal  
 Pharmacy: [facility] DR JACEK URRUTIA [address] [facility], [address] [place] [address] #: [phone] Route: Oral  
  
Follow-up Instructions Return in about 6 months (around 2/17/2019), or if symptoms worsen or fail to improve, for with ekg. Patient Instructions There are no discontinued medications. Body Mass Index: Care Instructions Your Care Instructions Body mass index (BMI) can help you see if your weight is raising your risk for health problems. It uses a formula to compare how much you weigh with how tall you are. · A BMI lower than 18.5 is considered underweight. · A BMI between 18.5 and 24.9 is considered healthy. · A BMI between 25 and 29.9 is considered overweight. A BMI of 30 or higher is considered obese. If your BMI is in the normal range, it means that you have a lower risk for weight-related health problems. If your BMI is in the overweight or obese range, you may be at increased risk for weight-related health problems, such as high blood pressure, heart disease, stroke, arthritis or joint pain, and diabetes. If your BMI is in the underweight range, you may be at increased risk for health problems such as fatigue, lower protection (immunity) against illness, muscle loss, bone loss, hair loss, and hormone problems. BMI is just one measure of your risk for weight-related health problems. You may be at higher risk for health problems if you are not active, you eat an unhealthy diet, or you drink too much alcohol or use tobacco products. Follow-up care is a key part of your treatment and safety. Be sure to make and go to all appointments, and call your doctor if you are having problems. It's also a good idea to know your test results and keep a list of the medicines you take. How can you care for yourself at home? · Practice healthy eating habits. This includes eating plenty of fruits, vegetables, whole grains, lean protein, and low-fat dairy. · If your doctor recommends it, get more exercise. Walking is a good choice. Bit by bit, increase the amount you walk every day. Try for at least 30 minutes on most days of the week. · Do not smoke. Smoking can increase your risk for health problems. If you need help quitting, talk to your doctor about stop-smoking programs and medicines. These can increase your chances of quitting for good. · Limit alcohol to 2 drinks a day for men and 1 drink a day for women. Too much alcohol can cause health problems. If you have a BMI higher than 25 · Your doctor may do other tests to check your risk for weight-related health problems. This may include measuring the distance around your waist. A waist measurement of more than 40 inches in men or 35 inches in women can increase the risk of weight-related health problems. · Talk with your doctor about steps you can take to stay healthy or improve your health. You may need to make lifestyle changes to lose weight and stay healthy, such as changing your diet and getting regular exercise. If you have a BMI lower than 18.5 · Your doctor may do other tests to check your risk for health problems. · Talk with your doctor about steps you can take to stay healthy or improve your health. You may need to make lifestyle changes to gain or maintain weight and stay healthy, such as getting more healthy foods in your diet and doing exercises to build muscle. Where can you learn more? Go to http://ave-kristofer.info/. Enter S176 in the search box to learn more about \"Body Mass Index: Care Instructions. \" Current as of: October 13, 2016 Content Version: 11.4 © 8413-6260 [employer]. Care instructions adapted under license by [employer] (which disclaims liability or warranty for this information).  If you have questions about a medical condition or this instruction, always ask your healthcare professional. [employer] disclaims any warranty or liability for your use of this information. Introducing [facility]! Soledad Redi introduces eGenerations patient portal. Now you can access parts of your medical record, email your doctor's office, and request medication refills online. 1. In your internet browser, go to [email or web address] 2. Click on the First Time User? Click Here link in the Sign In box. You will see the New Member Sign Up page. 3. Enter your eGenerations Access Code exactly as it appears below. You will not need to use this code after youve completed the sign-up process. If you do not sign up before the expiration date, you must request a new code. · eGenerations Access Code: [email or web address] Expires: 11/15/2018  8:18 AM 
 
4. Enter the last four digits of your Social Security Number (xxxx) and Date of Birth (mm/dd/yyyy) as indicated and click Submit. You will be taken to the next sign-up page. 5. Create a eGenerations ID. This will be your eGenerations login ID and cannot be changed, so think of one that is secure and easy to remember. 6. Create a eGenerations password. You can change your password at any time. 7. Enter your Password Reset Question and Answer. This can be used at a later time if you forget your password. 8. Enter your e-mail address. You will receive e-mail notification when new information is available in [address] 9. Click Sign Up. You can now view and download portions of your medical record. 10. Click the Download Summary menu link to download a portable copy of your medical information. If you have questions, please visit the Frequently Asked Questions section of the eGenerations website. Remember, eGenerations is NOT to be used for urgent needs. For medical emergencies, dial 911. Now available from your iPhone and Android! Please provide this summary of care documentation to your next provider. Your primary care clinician is listed as Rivera Valdez. If you have any questions after today's visit, please call [phone].

## 2018-08-17 NOTE — PROGRESS NOTES
HISTORY OF PRESENT ILLNESS  Cara Morel is a 80 y.o. male. HPI Comments: Follow-up of CAD, hypertension, hyperlipidemia and obesity. History of old PCI. Patient denies significant chest pain, SOB, palpitations, edema, dizziness      Hypertension   The history is provided by the medical records. This is a chronic problem. Cholesterol Problem   The history is provided by the medical records. This is a chronic problem. Review of Systems   Constitutional: Negative for chills, fever, malaise/fatigue and weight loss. HENT: Negative for nosebleeds. Eyes: Negative for discharge. Respiratory: Negative for cough and wheezing. Cardiovascular: Negative for palpitations, orthopnea, claudication, leg swelling and PND. Gastrointestinal: Negative for diarrhea, nausea and vomiting. Genitourinary: Negative for dysuria and hematuria. Musculoskeletal: Negative for joint pain. Skin: Negative for rash. Neurological: Negative for dizziness, seizures and loss of consciousness. Endo/Heme/Allergies: Negative for polydipsia. Does not bruise/bleed easily. Psychiatric/Behavioral: Negative for depression and substance abuse. The patient does not have insomnia.       Allergies   Allergen Reactions    Plavix [Clopidogrel] Unknown (comments)       Past Medical History:   Diagnosis Date    CAD (coronary artery disease)     Coronary atherosclerosis of native coronary artery     Essential hypertension     Essential hypertension, benign     Obesity, unspecified     Other and unspecified hyperlipidemia     Type II or unspecified type diabetes mellitus without mention of complication, not stated as uncontrolled        Family History   Problem Relation Age of Onset    Heart Attack Neg Hx     Stroke Neg Hx        Social History   Substance Use Topics    Smoking status: Never Smoker    Smokeless tobacco: Never Used    Alcohol use No        Current Outpatient Prescriptions   Medication Sig    APPLE CIDER VINEGAR PO Take  by mouth.  amLODIPine-benazepril (LOTREL) 10-40 mg per capsule Take 1 Cap by mouth daily.  atorvastatin (LIPITOR) 40 mg tablet Take 1 Tab by mouth daily.  hydroCHLOROthiazide (HYDRODIURIL) 12.5 mg tablet TAKE ONE TABLET BY MOUTH EVERY MONDAY AND THURSDAY (GENERIC FOR HYDRODIURIL)  Indications: hypertension    loratadine (CLARITIN) 10 mg tablet Take 10 mg by mouth.  OMEGA-3 FATTY ACIDS/FISH OIL (OMEGA 3 FISH OIL PO) Take  by mouth.  GARLIC PO Take 61,265 mg by mouth daily.  metFORMIN (GLUCOPHAGE) 1,000 mg tablet Take 1,000 mg by mouth two (2) times daily (with meals).  buffered aspirin (BUFFERIN) 325 mg tablet Take 325 mg by mouth daily.  Comp Stocking, Knee,Long,Small (T.E.D. KNEE LENGTH-M-LONG) misc 2 Each by Does Not Apply route daily. No current facility-administered medications for this visit. Past Surgical History:   Procedure Laterality Date    CARDIAC SURG PROCEDURE UNLIST      HX CORONARY STENT PLACEMENT  2003       Diagnostic Studies:  CARDIOLOGY STUDIES 4/25/2014 12/9/2008   Treadmill Stress Test Result - WNL   Exercise Nuclear Stress Test Result nl scan, 50%EF -   Some recent data might be hidden       Visit Vitals    /71    Pulse 65    Ht 6' 4\" (1.93 m)    Wt 245 lb (111.1 kg)    BMI 29.82 kg/m2       Mr. Elda Alberts has a reminder for a \"due or due soon\" health maintenance. I have asked that he contact his primary care provider for follow-up on this health maintenance. Physical Exam   Constitutional: He is oriented to person, place, and time. He appears well-developed and well-nourished. No distress. HENT:   Head: Normocephalic and atraumatic. Mouth/Throat: Normal dentition. Eyes: Right eye exhibits no discharge. Left eye exhibits no discharge. No scleral icterus. Neck: Neck supple. No JVD present. Carotid bruit is not present. No thyromegaly present.    Cardiovascular: Normal rate, regular rhythm, S1 normal, S2 normal, normal heart sounds and intact distal pulses. Exam reveals no gallop and no friction rub. No murmur heard. Pulmonary/Chest: Effort normal and breath sounds normal. He has no wheezes. He has no rales. Abdominal: Soft. He exhibits no mass. There is no tenderness. Musculoskeletal: He exhibits no edema. Lymphadenopathy:        Right cervical: No superficial cervical adenopathy present. Left cervical: No superficial cervical adenopathy present. Neurological: He is alert and oriented to person, place, and time. Skin: Skin is warm and dry. No rash noted. Psychiatric: He has a normal mood and affect. His behavior is normal.       ASSESSMENT and PLAN    HLD: LIPID COMPLETE PANEL2/1/2018  [facility]  Component Name Value Ref Range   Cholesterol 138 110 - 200 mg/dL   Triglyceride 81 40 - 149 mg/dL   HDL 49 40 - 59 mg/dL   LDL CALCULATION 73 50 - 99 mg/dL   VLDL CALCULATION 16 8 - 30 mg/dL     8/18 patient losing weight successfully very well. Blood pressure mildly elevated. Add low-dose alpha-blocker and follow the blood pressures with home chart. CAD is stable and continue present medications. Lipids well controlled and discussed with patient. Diagnoses and all orders for this visit:    1. Atherosclerosis of native coronary artery of native heart without angina pectoris    2. Essential hypertension, benign  -     doxazosin (CARDURA) 1 mg tablet; Take 1 Tab by mouth nightly. 3. Pure hypercholesterolemia    4. Old myocardial infarction    5. History of coronary artery stent placement    6. Obesity (BMI 30.0-34. 9)        Pertinent laboratory and test data reviewed and discussed with patient. See patient instructions also for other medical advice given    There are no discontinued medications. Follow-up Disposition:  Return in about 6 months (around 2/17/2019), or if symptoms worsen or fail to improve, for with ekg.

## 2018-08-17 NOTE — LETTER
Tom Sharp. 
1950 8/17/2018 Dear Anitha James MD 
 
I had the pleasure of evaluating  Mr. Shan Lopez in office today. Below are the relevant portions of my assessment and plan of care. ICD-10-CM ICD-9-CM 1. Atherosclerosis of native coronary artery of native heart without angina pectoris I25.10 414.01   
2. Essential hypertension, benign I10 401.1 doxazosin (CARDURA) 1 mg tablet 3. Pure hypercholesterolemia E78.00 272.0   
4. Old myocardial infarction I25.2 412 5. History of coronary artery stent placement Z95.5 V45.82   
6. Obesity (BMI 30.0-34. 9) E66.9 278.00 Current Outpatient Prescriptions Medication Sig Dispense Refill  APPLE CIDER VINEGAR PO Take  by mouth.  doxazosin (CARDURA) 1 mg tablet Take 1 Tab by mouth nightly. 90 Tab 1  
 amLODIPine-benazepril (LOTREL) 10-40 mg per capsule Take 1 Cap by mouth daily. 90 Cap 1  
 atorvastatin (LIPITOR) 40 mg tablet Take 1 Tab by mouth daily. 90 Tab 1  
 hydroCHLOROthiazide (HYDRODIURIL) 12.5 mg tablet TAKE ONE TABLET BY MOUTH EVERY MONDAY AND THURSDAY (GENERIC FOR HYDRODIURIL)  Indications: hypertension 30 Tab 3  
 loratadine (CLARITIN) 10 mg tablet Take 10 mg by mouth.  OMEGA-3 FATTY ACIDS/FISH OIL (OMEGA 3 FISH OIL PO) Take  by mouth.  GARLIC PO Take 56,620 mg by mouth daily.  metFORMIN (GLUCOPHAGE) 1,000 mg tablet Take 1,000 mg by mouth two (2) times daily (with meals).  buffered aspirin (BUFFERIN) 325 mg tablet Take 325 mg by mouth daily.  Comp Stocking, Knee,Long,Small (T.E.D. KNEE LENGTH-M-LONG) misc 2 Each by Does Not Apply route daily. 2 Each 0 Orders Placed This Encounter  APPLE CIDER VINEGAR PO Sig: Take  by mouth.  doxazosin (CARDURA) 1 mg tablet Sig: Take 1 Tab by mouth nightly. Dispense:  90 Tab Refill:  1 If you have questions, please do not hesitate to call me. I look forward to following Mr. Shan Lopez along with you.  
 
Sincerely, 
 Walter Skinner MD

## 2018-08-17 NOTE — PROGRESS NOTES
Patient brought medications list.    1. Have you been to the ER, urgent care clinic since your last visit? Hospitalized since your last visit? No    2. Have you seen or consulted any other health care providers outside of the [address] [place] [address] since your last visit? Include any pap smears or colon screening. No     3. Since your last visit, have you had any of the following symptoms? No    4. Have you had any blood work, X-rays or cardiac testing? Yes Where: PCP Office Reason for visit: Blood Sugar Check     Requested: NO     In [facility]: NO    5. Where do you normally have your labs drawn? PCP Office    6. Do you need any refills today?    No

## 2018-10-17 ENCOUNTER — TELEPHONE (OUTPATIENT)
Dept: CARDIOLOGY CLINIC | Age: 68
End: 2018-10-17

## 2018-10-17 DIAGNOSIS — I25.10 ATHEROSCLEROSIS OF NATIVE CORONARY ARTERY OF NATIVE HEART WITHOUT ANGINA PECTORIS: Primary | ICD-10-CM

## 2018-10-17 DIAGNOSIS — Z01.810 PREOP CARDIOVASCULAR EXAM: ICD-10-CM

## 2018-10-17 DIAGNOSIS — Z95.5 HISTORY OF CORONARY ARTERY STENT PLACEMENT: ICD-10-CM

## 2018-10-17 NOTE — TELEPHONE ENCOUNTER
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  Can we write this note, please advise    Fax [phone]

## 2018-10-17 NOTE — LETTER
NOTIFICATION RETURN TO WORK / SCHOOL 
 
10/19/2018 2:49 PM 
 
Mr. Giancarlo Ferrell. 
[address] [facility] [address] To Whom It May Concern: 
 
Giancarlo Palafox is currently under the care of [address] [place] [address]. 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 If there are questions or concerns please have the patient contact our office. Sincerely, Armando Carranza MD

## 2018-10-17 NOTE — TELEPHONE ENCOUNTER
Please schedule him for a stress test and an echocardiogram and we can give this note after the testing

## 2018-10-29 DIAGNOSIS — E78.00 PURE HYPERCHOLESTEROLEMIA: ICD-10-CM

## 2018-10-30 RX ORDER — ATORVASTATIN CALCIUM 40 MG/1
40 TABLET, FILM COATED ORAL DAILY
Qty: 90 TAB | Refills: 1 | Status: SHIPPED | OUTPATIENT
Start: 2018-10-30 | End: 2019-02-15

## 2019-02-15 ENCOUNTER — OFFICE VISIT (OUTPATIENT)
Dept: CARDIOLOGY CLINIC | Age: 69
End: 2019-02-15

## 2019-02-15 VITALS
BODY MASS INDEX: 29.98 KG/M2 | DIASTOLIC BLOOD PRESSURE: 86 MMHG | SYSTOLIC BLOOD PRESSURE: 142 MMHG | RESPIRATION RATE: 17 BRPM | HEART RATE: 67 BPM | WEIGHT: 246.2 LBS | HEIGHT: 76 IN

## 2019-02-15 DIAGNOSIS — I25.10 ATHEROSCLEROSIS OF NATIVE CORONARY ARTERY OF NATIVE HEART WITHOUT ANGINA PECTORIS: Primary | ICD-10-CM

## 2019-02-15 DIAGNOSIS — E78.00 PURE HYPERCHOLESTEROLEMIA: ICD-10-CM

## 2019-02-15 DIAGNOSIS — I10 ESSENTIAL HYPERTENSION, BENIGN: ICD-10-CM

## 2019-02-15 DIAGNOSIS — Z95.5 HISTORY OF CORONARY ARTERY STENT PLACEMENT: ICD-10-CM

## 2019-02-15 RX ORDER — HYDROCHLOROTHIAZIDE 12.5 MG/1
TABLET ORAL
Qty: 30 TAB | Refills: 3 | Status: SHIPPED | OUTPATIENT
Start: 2019-02-15 | End: 2019-09-27

## 2019-02-15 RX ORDER — AMLODIPINE BESYLATE AND BENAZEPRIL HYDROCHLORIDE 10; 40 MG/1; MG/1
1 CAPSULE ORAL DAILY
Qty: 90 CAP | Refills: 1 | Status: SHIPPED | OUTPATIENT
Start: 2019-02-15 | End: 2019-09-03 | Stop reason: SDUPTHER

## 2019-02-15 NOTE — PROGRESS NOTES
HISTORY OF PRESENT ILLNESS Oleg Song is a 80 y.o. male. Follow-up of CAD, hypertension, hyperlipidemia and obesity. History of old PCI. Patient denies significant chest pain, SOB, palpitations, edema, dizziness Hypertension The history is provided by the medical records. This is a chronic problem. Cholesterol Problem The history is provided by the medical records. This is a chronic problem. Review of Systems Constitutional: Negative for chills, fever, malaise/fatigue and weight loss. HENT: Negative for nosebleeds. Eyes: Negative for discharge. Respiratory: Negative for cough and wheezing. Cardiovascular: Negative for palpitations, orthopnea, claudication, leg swelling and PND. Gastrointestinal: Negative for diarrhea, nausea and vomiting. Genitourinary: Negative for dysuria and hematuria. Musculoskeletal: Negative for joint pain. Skin: Negative for rash. Neurological: Negative for dizziness, seizures and loss of consciousness. Endo/Heme/Allergies: Negative for polydipsia. Does not bruise/bleed easily. Psychiatric/Behavioral: Negative for depression and substance abuse. The patient does not have insomnia. Allergies Allergen Reactions  Plavix [Clopidogrel] Unknown (comments) Past Medical History:  
Diagnosis Date  CAD (coronary artery disease)  Coronary atherosclerosis of native coronary artery  Essential hypertension  Essential hypertension, benign  Obesity, unspecified  Other and unspecified hyperlipidemia  Type II or unspecified type diabetes mellitus without mention of complication, not stated as uncontrolled Family History Problem Relation Age of Onset  Heart Attack Neg Hx  Stroke Neg Hx Social History Tobacco Use  Smoking status: Never Smoker  Smokeless tobacco: Never Used Substance Use Topics  Alcohol use: No  
  Alcohol/week: 0.0 oz  Drug use:  No  
  
 
 Current Outpatient Medications Medication Sig  APPLE CIDER VINEGAR PO Take  by mouth.  doxazosin (CARDURA) 1 mg tablet Take 1 Tab by mouth nightly.  amLODIPine-benazepril (LOTREL) 10-40 mg per capsule Take 1 Cap by mouth daily.  hydroCHLOROthiazide (HYDRODIURIL) 12.5 mg tablet TAKE ONE TABLET BY MOUTH EVERY MONDAY AND THURSDAY (GENERIC FOR HYDRODIURIL)  Indications: hypertension  loratadine (CLARITIN) 10 mg tablet Take 10 mg by mouth.  OMEGA-3 FATTY ACIDS/FISH OIL (OMEGA 3 FISH OIL PO) Take  by mouth.  GARLIC PO Take 31,099 mg by mouth daily.  metFORMIN (GLUCOPHAGE) 1,000 mg tablet Take 1,000 mg by mouth two (2) times daily (with meals).  buffered aspirin (BUFFERIN) 325 mg tablet Take 325 mg by mouth daily. No current facility-administered medications for this visit. Past Surgical History:  
Procedure Laterality Date  CARDIAC SURG PROCEDURE UNLIST  HX CORONARY STENT PLACEMENT  2003 Diagnostic Studies: 
CARDIOLOGY STUDIES 4/25/2014 12/9/2008 Treadmill Stress Test Result - WNL Exercise Nuclear Stress Test Result nl scan, 50%EF - Some recent data might be hidden Visit Vitals /86 (BP 1 Location: Right arm, BP Patient Position: Sitting) Pulse 67 Resp 17 Ht 6' 4\" (1.93 m) Wt 111.7 kg (246 lb 3.2 oz) BMI 29.97 kg/m² Mr. Heike Vasquez has a reminder for a \"due or due soon\" health maintenance. I have asked that he contact his primary care provider for follow-up on this health maintenance. Physical Exam  
Constitutional: He is oriented to person, place, and time. He appears well-developed and well-nourished. No distress. HENT:  
Head: Normocephalic and atraumatic. Mouth/Throat: Normal dentition. Eyes: Right eye exhibits no discharge. Left eye exhibits no discharge. No scleral icterus. Neck: Neck supple. No JVD present. Carotid bruit is not present. No thyromegaly present. Cardiovascular: Normal rate, regular rhythm, S1 normal, S2 normal, normal heart sounds and intact distal pulses. Exam reveals no gallop and no friction rub. No murmur heard. Pulmonary/Chest: Effort normal and breath sounds normal. He has no wheezes. He has no rales. Abdominal: Soft. He exhibits no mass. There is no tenderness. Musculoskeletal: He exhibits no edema. Lymphadenopathy:  
     Right cervical: No superficial cervical adenopathy present. Left cervical: No superficial cervical adenopathy present. Neurological: He is alert and oriented to person, place, and time. Skin: Skin is warm and dry. No rash noted. Psychiatric: He has a normal mood and affect. His behavior is normal.  
 
 
ASSESSMENT and PLAN 
 
HLD: LIPID COMPLETE PANEL2/1/2018 [facility] Component Name Value Ref Range Cholesterol 138 110 - 200 mg/dL Triglyceride 81 40 - 149 mg/dL HDL 49 40 - 59 mg/dL LDL CALCULATION 73 50 - 99 mg/dL VLDL CALCULATION 16 8 - 30 mg/dL 8/18 patient losing weight successfully very well. CAD: PCI in 2001 approx Blood pressure mildly elevated due to noncompliance. Discussed with patient and explained the importance of controlling blood pressure to avoid future cardiovascular events including heart attacks and strokes. Follow-up home BP chart CAD is stable and continue present medications. Diagnoses and all orders for this visit: 1. Atherosclerosis of native coronary artery of native heart without angina pectoris -     AMB POC EKG ROUTINE W/ 12 LEADS, INTER & REP 2. History of coronary artery stent placement 3. Essential hypertension, benign 
-     amLODIPine-benazepril (LOTREL) 10-40 mg per capsule; Take 1 Cap by mouth daily. -     hydroCHLOROthiazide (HYDRODIURIL) 12.5 mg tablet; TAKE ONE TABLET BY MOUTH EVERY MONDAY AND THURSDAY (GENERIC FOR HYDRODIURIL) 4. Pure hypercholesterolemia -     LIPID PANEL; Future -     HEPATIC FUNCTION PANEL; Future Pertinent laboratory and test data reviewed and discussed with patient. See patient instructions also for other medical advice given Medications Discontinued During This Encounter Medication Reason  atorvastatin (LIPITOR) 40 mg tablet  Comp Stocking, Knee,Long,Small (T.E.D. KNEE LENGTH-M-LONG) misc  amLODIPine-benazepril (LOTREL) 10-40 mg per capsule Reorder  hydroCHLOROthiazide (HYDRODIURIL) 12.5 mg tablet Reorder Follow-up Disposition: 
Return in about 6 months (around 8/15/2019), or if symptoms worsen or fail to improve.

## 2019-02-15 NOTE — LETTER
Brandy Come. 
1950 2/15/2019 Dear Pascale Montenegro MD 
 
I had the pleasure of evaluating  Mr. Padron Innocent in office today. Below are the relevant portions of my assessment and plan of care. ICD-10-CM ICD-9-CM 1. Atherosclerosis of native coronary artery of native heart without angina pectoris I25.10 414.01 AMB POC EKG ROUTINE W/ 12 LEADS, INTER & REP 2. History of coronary artery stent placement Z95.5 V45.82   
3. Essential hypertension, benign I10 401.1 amLODIPine-benazepril (LOTREL) 10-40 mg per capsule  
   hydroCHLOROthiazide (HYDRODIURIL) 12.5 mg tablet 4. Pure hypercholesterolemia E78.00 272.0 LIPID PANEL  
   HEPATIC FUNCTION PANEL Current Outpatient Medications Medication Sig Dispense Refill  amLODIPine-benazepril (LOTREL) 10-40 mg per capsule Take 1 Cap by mouth daily. 90 Cap 1  
 hydroCHLOROthiazide (HYDRODIURIL) 12.5 mg tablet TAKE ONE TABLET BY MOUTH EVERY MONDAY AND THURSDAY (GENERIC FOR HYDRODIURIL) 30 Tab 3  APPLE CIDER VINEGAR PO Take  by mouth.  doxazosin (CARDURA) 1 mg tablet Take 1 Tab by mouth nightly. 90 Tab 1  
 loratadine (CLARITIN) 10 mg tablet Take 10 mg by mouth.  OMEGA-3 FATTY ACIDS/FISH OIL (OMEGA 3 FISH OIL PO) Take  by mouth.  GARLIC PO Take 04,199 mg by mouth daily.  metFORMIN (GLUCOPHAGE) 1,000 mg tablet Take 1,000 mg by mouth two (2) times daily (with meals).  buffered aspirin (BUFFERIN) 325 mg tablet Take 325 mg by mouth daily. Orders Placed This Encounter  LIPID PANEL Standing Status:   Future Standing Expiration Date:   5/18/2019  
 HEPATIC FUNCTION PANEL Standing Status:   Future Standing Expiration Date:   5/18/2019  AMB POC EKG ROUTINE W/ 12 LEADS, INTER & REP Order Specific Question:   Reason for Exam: Answer:   follow up  amLODIPine-benazepril (LOTREL) 10-40 mg per capsule Sig: Take 1 Cap by mouth daily. Dispense:  90 Cap Refill:  1  hydroCHLOROthiazide (HYDRODIURIL) 12.5 mg tablet Sig: TAKE ONE TABLET BY MOUTH EVERY MONDAY AND THURSDAY (GENERIC FOR HYDRODIURIL) Dispense:  30 Tab Refill:  3 If you have questions, please do not hesitate to call me. I look forward to following Mr. Calle Handing along with you. Sincerely, Basilio Gracia MD

## 2019-02-15 NOTE — PATIENT INSTRUCTIONS
Medications Discontinued During This Encounter Medication Reason  atorvastatin (LIPITOR) 40 mg tablet  Comp Stocking, Knee,Long,Small (T.E.D. KNEE LENGTH-M-LONG) misc  amLODIPine-benazepril (LOTREL) 10-40 mg per capsule Reorder  hydroCHLOROthiazide (HYDRODIURIL) 12.5 mg tablet Reorder After the recommended changes have been made in blood pressure medicines, patient advised to keep BP/HR(pulse rate) chart twice daily and bring us results in next 2 weeks or so. Patient may send the results via \"My Chart\" if desired. Please rest for 5-10 minutes before checking blood pressure High Blood Pressure: Care Instructions Overview It's normal for blood pressure to go up and down throughout the day. But if it stays up, you have high blood pressure. Another name for high blood pressure is hypertension. Despite what a lot of people think, high blood pressure usually doesn't cause headaches or make you feel dizzy or lightheaded. It usually has no symptoms. But it does increase your risk of stroke, heart attack, and other problems. You and your doctor will talk about your risks of these problems based on your blood pressure. Your doctor will give you a goal for your blood pressure. Your goal will be based on your health and your age. Lifestyle changes, such as eating healthy and being active, are always important to help lower blood pressure. You might also take medicine to reach your blood pressure goal. 
Follow-up care is a key part of your treatment and safety. Be sure to make and go to all appointments, and call your doctor if you are having problems. It's also a good idea to know your test results and keep a list of the medicines you take. How can you care for yourself at home? Medical treatment · If you stop taking your medicine, your blood pressure will go back up. You may take one or more types of medicine to lower your blood pressure. Be safe with medicines. Take your medicine exactly as prescribed. Call your doctor if you think you are having a problem with your medicine. · Talk to your doctor before you start taking aspirin every day. Aspirin can help certain people lower their risk of a heart attack or stroke. But taking aspirin isn't right for everyone, because it can cause serious bleeding. · See your doctor regularly. You may need to see the doctor more often at first or until your blood pressure comes down. · If you are taking blood pressure medicine, talk to your doctor before you take decongestants or anti-inflammatory medicine, such as ibuprofen. Some of these medicines can raise blood pressure. · Learn how to check your blood pressure at home. Lifestyle changes · Stay at a healthy weight. This is especially important if you put on weight around the waist. Losing even 10 pounds can help you lower your blood pressure. · If your doctor recommends it, get more exercise. Walking is a good choice. Bit by bit, increase the amount you walk every day. Try for at least 30 minutes on most days of the week. You also may want to swim, bike, or do other activities. · Avoid or limit alcohol. Talk to your doctor about whether you can drink any alcohol. · Try to limit how much sodium you eat to less than 2,300 milligrams (mg) a day. Your doctor may ask you to try to eat less than 1,500 mg a day. · Eat plenty of fruits (such as bananas and oranges), vegetables, legumes, whole grains, and low-fat dairy products. · Lower the amount of saturated fat in your diet. Saturated fat is found in animal products such as milk, cheese, and meat. Limiting these foods may help you lose weight and also lower your risk for heart disease. · Do not smoke. Smoking increases your risk for heart attack and stroke. If you need help quitting, talk to your doctor about stop-smoking programs and medicines. These can increase your chances of quitting for good. When should you call for help? Call 911 anytime you think you may need emergency care. This may mean having symptoms that suggest that your blood pressure is causing a serious heart or blood vessel problem. Your blood pressure may be over 180/120. 
 For example, call 911 if: 
  · You have symptoms of a heart attack. These may include: 
? Chest pain or pressure, or a strange feeling in the chest. 
? Sweating. ? Shortness of breath. ? Nausea or vomiting. ? Pain, pressure, or a strange feeling in the back, neck, jaw, or upper belly or in one or both shoulders or arms. ? Lightheadedness or sudden weakness. ? A fast or irregular heartbeat.  
  · You have symptoms of a stroke. These may include: 
? Sudden numbness, tingling, weakness, or loss of movement in your face, arm, or leg, especially on only one side of your body. ? Sudden vision changes. ? Sudden trouble speaking. ? Sudden confusion or trouble understanding simple statements. ? Sudden problems with walking or balance. ? A sudden, severe headache that is different from past headaches.  
  · You have severe back or belly pain.  
 Do not wait until your blood pressure comes down on its own. Get help right away. 
 Call your doctor now or seek immediate care if: 
  · Your blood pressure is much higher than normal (such as 180/120 or higher), but you don't have symptoms.  
  · You think high blood pressure is causing symptoms, such as: 
? Severe headache. 
? Blurry vision.  
 Watch closely for changes in your health, and be sure to contact your doctor if: 
  · Your blood pressure measures higher than your doctor recommends at least 2 times. That means the top number is higher or the bottom number is higher, or both.  
  · You think you may be having side effects from your blood pressure medicine. Where can you learn more? Go to http://ave-kristofer.info/.  
Enter H130 in the search box to learn more about \"High Blood Pressure: Care Instructions. \" Current as of: July 22, 2018 Content Version: 11.9 © 1144-0524 [employer]. Care instructions adapted under license by [employer] (which disclaims liability or warranty for this information). If you have questions about a medical condition or this instruction, always ask your healthcare professional. [place] [address] any warranty or liability for your use of this information.

## 2019-02-15 NOTE — PROGRESS NOTES
1. Have you been to the ER, urgent care clinic since your last visit? Hospitalized since your last visit? No 
 
2. Have you seen or consulted any other health care providers outside of the [facility] since your last visit? Include any pap smears or colon screening. No 
 
3. Since your last visit, have you had any of the following symptoms? No 
4. Have you had any blood work, X-rays or cardiac testing? No 
5. Where do you normally have your labs drawn? Dr. Aleksander Overton PCP 6. Do you need any refills today?    Yes

## 2019-09-03 DIAGNOSIS — I10 ESSENTIAL HYPERTENSION, BENIGN: ICD-10-CM

## 2019-09-03 RX ORDER — AMLODIPINE BESYLATE AND BENAZEPRIL HYDROCHLORIDE 10; 40 MG/1; MG/1
CAPSULE ORAL
Qty: 90 CAP | Refills: 1 | Status: SHIPPED | OUTPATIENT
Start: 2019-09-03 | End: 2019-12-13 | Stop reason: SDUPTHER

## 2019-09-27 ENCOUNTER — OFFICE VISIT (OUTPATIENT)
Dept: CARDIOLOGY CLINIC | Age: 69
End: 2019-09-27

## 2019-09-27 VITALS
SYSTOLIC BLOOD PRESSURE: 102 MMHG | HEART RATE: 69 BPM | BODY MASS INDEX: 29.47 KG/M2 | HEIGHT: 76 IN | WEIGHT: 242 LBS | DIASTOLIC BLOOD PRESSURE: 56 MMHG

## 2019-09-27 DIAGNOSIS — Z95.5 HISTORY OF CORONARY ARTERY STENT PLACEMENT: ICD-10-CM

## 2019-09-27 DIAGNOSIS — E78.00 PURE HYPERCHOLESTEROLEMIA: ICD-10-CM

## 2019-09-27 DIAGNOSIS — I25.10 ATHEROSCLEROSIS OF NATIVE CORONARY ARTERY OF NATIVE HEART WITHOUT ANGINA PECTORIS: Primary | ICD-10-CM

## 2019-09-27 DIAGNOSIS — I10 ESSENTIAL HYPERTENSION, BENIGN: ICD-10-CM

## 2019-09-27 RX ORDER — ATORVASTATIN CALCIUM 40 MG/1
TABLET, FILM COATED ORAL DAILY
COMMUNITY
End: 2020-08-28 | Stop reason: SDUPTHER

## 2019-09-27 NOTE — PATIENT INSTRUCTIONS

## 2019-09-27 NOTE — PROGRESS NOTES
1. Have you been to the ER, urgent care clinic since your last visit? Hospitalized since your last visit? No    2. Have you seen or consulted any other health care providers outside of the [address] [place] [address] since your last visit? Include any pap smears or colon screening. Yes Where: PCP Reason for visit: Routine Visit     3. Since your last visit, have you had any of the following symptoms?      dizziness. 4.  Have you had any blood work, X-rays or cardiac testing? No      5. Where do you normally have your labs drawn? PCP Office    6. Do you need any refills today?    Yes

## 2019-09-27 NOTE — PROGRESS NOTES
HISTORY OF PRESENT ILLNESS  Tono Mcdonnell is a 77 y.o. male. Follow-up of CAD, hypertension, hyperlipidemia and obesity. History of old PCI. Patient denies significant chest pain, SOB, palpitations, edema, dizziness      Cholesterol Problem   The history is provided by the medical records. This is a chronic problem. Hypertension   The history is provided by the medical records. This is a chronic problem. Review of Systems   Constitutional: Negative for chills, fever, malaise/fatigue and weight loss. HENT: Negative for nosebleeds. Eyes: Negative for discharge. Respiratory: Negative for cough and wheezing. Cardiovascular: Negative for palpitations, orthopnea, claudication, leg swelling and PND. Gastrointestinal: Negative for diarrhea, nausea and vomiting. Genitourinary: Negative for dysuria and hematuria. Musculoskeletal: Negative for joint pain. Skin: Negative for rash. Neurological: Negative for dizziness, seizures and loss of consciousness. Endo/Heme/Allergies: Negative for polydipsia. Does not bruise/bleed easily. Psychiatric/Behavioral: Negative for depression and substance abuse. The patient does not have insomnia. Allergies   Allergen Reactions    Plavix [Clopidogrel] Unknown (comments)       Past Medical History:   Diagnosis Date    CAD (coronary artery disease)     Coronary atherosclerosis of native coronary artery     Essential hypertension     Essential hypertension, benign     Obesity, unspecified     Other and unspecified hyperlipidemia     Type II or unspecified type diabetes mellitus without mention of complication, not stated as uncontrolled        Family History   Problem Relation Age of Onset    Heart Attack Neg Hx     Stroke Neg Hx        Social History     Tobacco Use    Smoking status: Never Smoker    Smokeless tobacco: Never Used   Substance Use Topics    Alcohol use: No     Alcohol/week: 0.0 standard drinks    Drug use:  No Current Outpatient Medications   Medication Sig    atorvastatin (LIPITOR) 40 mg tablet Take  by mouth daily.  amLODIPine-benazepril (LOTREL) 10-40 mg per capsule TAKE 1 CAPSULE BY MOUTH ONCE DAILY    APPLE CIDER VINEGAR PO Take  by mouth.  doxazosin (CARDURA) 1 mg tablet Take 1 Tab by mouth nightly.  loratadine (CLARITIN) 10 mg tablet Take 10 mg by mouth.  OMEGA-3 FATTY ACIDS/FISH OIL (OMEGA 3 FISH OIL PO) Take  by mouth.  GARLIC PO Take 40,821 mg by mouth daily.  metFORMIN (GLUCOPHAGE) 1,000 mg tablet Take 1,000 mg by mouth two (2) times daily (with meals).  buffered aspirin (BUFFERIN) 325 mg tablet Take 81 mg by mouth daily. No current facility-administered medications for this visit. Past Surgical History:   Procedure Laterality Date    CARDIAC SURG PROCEDURE UNLIST      HX CORONARY STENT PLACEMENT  2003       Diagnostic Studies:  CARDIOLOGY STUDIES 4/25/2014 12/9/2008   Treadmill Stress Test Result - WNL   Exercise Nuclear Stress Test Result nl scan, 50%EF -   Some recent data might be hidden       Visit Vitals  /56   Pulse 69   Ht 6' 4\" (1.93 m)   Wt 109.8 kg (242 lb)   BMI 29.46 kg/m²       Mr. Oli Rai has a reminder for a \"due or due soon\" health maintenance. I have asked that he contact his primary care provider for follow-up on this health maintenance. Physical Exam   Constitutional: He is oriented to person, place, and time. He appears well-developed and well-nourished. No distress. HENT:   Head: Normocephalic and atraumatic. Mouth/Throat: Normal dentition. Eyes: Right eye exhibits no discharge. Left eye exhibits no discharge. No scleral icterus. Neck: Neck supple. No JVD present. Carotid bruit is not present. No thyromegaly present. Cardiovascular: Normal rate, regular rhythm, S1 normal, S2 normal, normal heart sounds and intact distal pulses. Exam reveals no gallop and no friction rub. No murmur heard.   Pulmonary/Chest: Effort normal and breath sounds normal. He has no wheezes. He has no rales. Abdominal: Soft. He exhibits no mass. There is no tenderness. Musculoskeletal: He exhibits no edema. Lymphadenopathy:        Right cervical: No superficial cervical adenopathy present. Left cervical: No superficial cervical adenopathy present. Neurological: He is alert and oriented to person, place, and time. Skin: Skin is warm and dry. No rash noted. Psychiatric: He has a normal mood and affect. His behavior is normal.   Nursing note and vitals reviewed. ASSESSMENT and PLAN    HLD: LIPID COMPLETE PANEL 3/2019    Cholesterol 173 110 - 200 mg/dL   Triglyceride 156 (H) 40 - 149 mg/dL   HDL 43 40 - 59 mg/dL   Cholesterol/HDL 4.0 0.0 - 5.0    LDL CALCULATION 99 50 - 99 mg/dL   VLDL CALCULATION 31 (H)          8/18 patient losing weight successfully very well. CAD: PCI in 2001 approx    Blood pressure mildly elevated due to noncompliance. Discussed with patient and explained the importance of controlling blood pressure to avoid future cardiovascular events including heart attacks and strokes. Follow-up home BP chart  CAD is stable and continue present medications. 9/2019  Stable CAD. B/P is controlled with current regimen. Lipid panel in 3.19 with LDL 99. He is now taking Atorvastatin 40 mg consistently. Will repeat lipids and increase Atorvastatin if needed for control. Will need NST in 10/18 to maintain CDL. Diagnoses and all orders for this visit:    1. Atherosclerosis of native coronary artery of native heart without angina pectoris    2. History of coronary artery stent placement    3. Essential hypertension, benign    4. Pure hypercholesterolemia  -     LIPID PANEL; Future  -     METABOLIC PANEL, COMPREHENSIVE; Future        Pertinent laboratory and test data reviewed and discussed with patient.   See patient instructions also for other medical advice given    Medications Discontinued During This Encounter   Medication Reason    hydroCHLOROthiazide (HYDRODIURIL) 12.5 mg tablet        Follow-up and Dispositions    · Return in about 1 year (around 9/27/2020), or if symptoms worsen or fail to improve. I have independently evaluated and examined the patient. Patient is asymptomatic. Physical exam is unremarkable and without any significant murmurs rubs gallops or signs of CHF. He is mildly overweight and it was discussed with him. He was not very regular with statins but now takes them regularly. We will check a repeat lipid profile and then adjust medications if need be. Diet weight and exercise discussed. All relevant labs and testing data's are reviewed. Care plan discussed and updated after review.   Warner Peoples MD

## 2019-12-13 RX ORDER — AMLODIPINE BESYLATE 10 MG/1
10 TABLET ORAL DAILY
Qty: 30 TAB | Refills: 1 | Status: SHIPPED | OUTPATIENT
Start: 2019-12-13 | End: 2020-10-19

## 2019-12-13 RX ORDER — BENAZEPRIL HYDROCHLORIDE 40 MG/1
40 TABLET ORAL DAILY
COMMUNITY
End: 2019-12-13 | Stop reason: SDUPTHER

## 2019-12-13 RX ORDER — AMLODIPINE BESYLATE 10 MG/1
TABLET ORAL DAILY
COMMUNITY
End: 2019-12-13 | Stop reason: SDUPTHER

## 2019-12-13 RX ORDER — BENAZEPRIL HYDROCHLORIDE 40 MG/1
40 TABLET ORAL DAILY
Qty: 30 TAB | Refills: 1 | Status: SHIPPED | OUTPATIENT
Start: 2019-12-13 | End: 2020-10-19

## 2020-08-28 RX ORDER — ATORVASTATIN CALCIUM 40 MG/1
40 TABLET, FILM COATED ORAL DAILY
Qty: 90 TAB | Refills: 3 | Status: SHIPPED | OUTPATIENT
Start: 2020-08-28 | End: 2021-10-18

## 2020-10-19 ENCOUNTER — OFFICE VISIT (OUTPATIENT)
Dept: CARDIOLOGY CLINIC | Age: 70
End: 2020-10-19
Payer: MEDICARE

## 2020-10-19 VITALS
BODY MASS INDEX: 29.06 KG/M2 | OXYGEN SATURATION: 97 % | DIASTOLIC BLOOD PRESSURE: 61 MMHG | WEIGHT: 238.6 LBS | SYSTOLIC BLOOD PRESSURE: 123 MMHG | HEIGHT: 76 IN | HEART RATE: 51 BPM | TEMPERATURE: 98.2 F

## 2020-10-19 DIAGNOSIS — I25.10 ATHEROSCLEROSIS OF NATIVE CORONARY ARTERY OF NATIVE HEART WITHOUT ANGINA PECTORIS: ICD-10-CM

## 2020-10-19 DIAGNOSIS — I10 ESSENTIAL HYPERTENSION, BENIGN: Primary | ICD-10-CM

## 2020-10-19 DIAGNOSIS — Z95.5 HISTORY OF CORONARY ARTERY STENT PLACEMENT: ICD-10-CM

## 2020-10-19 DIAGNOSIS — E78.00 PURE HYPERCHOLESTEROLEMIA: ICD-10-CM

## 2020-10-19 PROCEDURE — 93000 ELECTROCARDIOGRAM COMPLETE: CPT | Performed by: INTERNAL MEDICINE

## 2020-10-19 PROCEDURE — G8419 CALC BMI OUT NRM PARAM NOF/U: HCPCS | Performed by: INTERNAL MEDICINE

## 2020-10-19 PROCEDURE — G8752 SYS BP LESS 140: HCPCS | Performed by: INTERNAL MEDICINE

## 2020-10-19 PROCEDURE — 1101F PT FALLS ASSESS-DOCD LE1/YR: CPT | Performed by: INTERNAL MEDICINE

## 2020-10-19 PROCEDURE — G8427 DOCREV CUR MEDS BY ELIG CLIN: HCPCS | Performed by: INTERNAL MEDICINE

## 2020-10-19 PROCEDURE — G8510 SCR DEP NEG, NO PLAN REQD: HCPCS | Performed by: INTERNAL MEDICINE

## 2020-10-19 PROCEDURE — G8536 NO DOC ELDER MAL SCRN: HCPCS | Performed by: INTERNAL MEDICINE

## 2020-10-19 PROCEDURE — G8754 DIAS BP LESS 90: HCPCS | Performed by: INTERNAL MEDICINE

## 2020-10-19 PROCEDURE — 3017F COLORECTAL CA SCREEN DOC REV: CPT | Performed by: INTERNAL MEDICINE

## 2020-10-19 PROCEDURE — 99214 OFFICE O/P EST MOD 30 MIN: CPT | Performed by: INTERNAL MEDICINE

## 2020-10-19 RX ORDER — AMLODIPINE AND BENAZEPRIL HYDROCHLORIDE 5; 20 MG/1; MG/1
1 CAPSULE ORAL DAILY
COMMUNITY

## 2020-10-19 RX ORDER — SITAGLIPTIN AND METFORMIN HYDROCHLORIDE 50; 1000 MG/1; MG/1
1 TABLET, FILM COATED ORAL 2 TIMES DAILY WITH MEALS
COMMUNITY
End: 2022-06-30 | Stop reason: ALTCHOICE

## 2020-10-19 NOTE — PATIENT INSTRUCTIONS
Medications Discontinued During This Encounter Medication Reason  amLODIPine (NORVASC) 10 mg tablet  benazepril (LOTENSIN) 40 mg tablet  doxazosin (CARDURA) 1 mg tablet  metFORMIN (GLUCOPHAGE) 1,000 mg tablet A Healthy Heart: Care Instructions Your Care Instructions Coronary artery disease, also called heart disease, occurs when a substance called plaque builds up in the vessels that supply oxygen-rich blood to your heart muscle. This can narrow the blood vessels and reduce blood flow. A heart attack happens when blood flow is completely blocked. A high-fat diet, smoking, and other factors increase the risk of heart disease. Your doctor has found that you have a chance of having heart disease. You can do lots of things to keep your heart healthy. It may not be easy, but you can change your diet, exercise more, and quit smoking. These steps really work to lower your chance of heart disease. Follow-up care is a key part of your treatment and safety. Be sure to make and go to all appointments, and call your doctor if you are having problems. It's also a good idea to know your test results and keep a list of the medicines you take. How can you care for yourself at home? Diet 
  · Use less salt when you cook and eat. This helps lower your blood pressure. Taste food before salting. Add only a little salt when you think you need it. With time, your taste buds will adjust to less salt.  
  · Eat fewer snack items, fast foods, canned soups, and other high-salt, high-fat, processed foods.  
  · Read food labels and try to avoid saturated and trans fats. They increase your risk of heart disease by raising cholesterol levels.  
  · Limit the amount of solid fat-butter, margarine, and shortening-you eat. Use olive, peanut, or canola oil when you cook. Bake, broil, and steam foods instead of frying them.  
  · Eat a variety of fruit and vegetables every day.  Dark green, deep orange, red, or yellow fruits and vegetables are especially good for you. Examples include spinach, carrots, peaches, and berries.  
  · Foods high in fiber can reduce your cholesterol and provide important vitamins and minerals. High-fiber foods include whole-grain cereals and breads, oatmeal, beans, brown rice, citrus fruits, and apples.  
  · Eat lean proteins. Heart-healthy proteins include seafood, lean meats and poultry, eggs, beans, peas, nuts, seeds, and soy products.  
  · Limit drinks and foods with added sugar. These include candy, desserts, and soda pop. Lifestyle changes 
  · If your doctor recommends it, get more exercise. Walking is a good choice. Bit by bit, increase the amount you walk every day. Try for at least 30 minutes on most days of the week. You also may want to swim, bike, or do other activities.  
  · Do not smoke. If you need help quitting, talk to your doctor about stop-smoking programs and medicines. These can increase your chances of quitting for good. Quitting smoking may be the most important step you can take to protect your heart. It is never too late to quit.  
  · Limit alcohol to 2 drinks a day for men and 1 drink a day for women. Too much alcohol can cause health problems.  
  · Manage other health problems such as diabetes, high blood pressure, and high cholesterol. If you think you may have a problem with alcohol or drug use, talk to your doctor. Medicines 
  · Take your medicines exactly as prescribed. Call your doctor if you think you are having a problem with your medicine.  
  · If your doctor recommends aspirin, take the amount directed each day. Make sure you take aspirin and not another kind of pain reliever, such as acetaminophen (Tylenol). When should you call for help? Call 911 if you have symptoms of a heart attack. These may include: 
  · Chest pain or pressure, or a strange feeling in the chest.  
  · Sweating.  
  · Shortness of breath.   · Pain, pressure, or a strange feeling in the back, neck, jaw, or upper belly or in one or both shoulders or arms.  
  · Lightheadedness or sudden weakness.  
  · A fast or irregular heartbeat. After you call 911, the  may tell you to chew 1 adult-strength or 2 to 4 low-dose aspirin. Wait for an ambulance. Do not try to drive yourself. Watch closely for changes in your health, and be sure to contact your doctor if you have any problems. Where can you learn more? Go to http://www.gray.com/ Enter N400 in the search box to learn more about \"A Healthy Heart: Care Instructions. \" Current as of: December 16, 2019               Content Version: 12.6 © 9717-9549 [employer]. Care instructions adapted under license by [employer] (which disclaims liability or warranty for this information). If you have questions about a medical condition or this instruction, always ask your healthcare professional. [address] any warranty or liability for your use of this information.

## 2020-10-19 NOTE — PROGRESS NOTES
1. Have you been to the ER, urgent care clinic since your last visit? Hospitalized since your last visit? No    2. Have you seen or consulted any other health care providers outside of the [address] [place] [address] since your last visit? Include any pap smears or colon screening. No     3. Since your last visit, have you had any of the following symptoms? No    4. Have you had any blood work, X-rays or cardiac testing? Yes Where: [facility] Reason for visit: Labs     Requested: YES     In [facility]: NO    5. Where do you normally have your labs drawn? [facility]    6. Do you need any refills today?    No

## 2020-10-19 NOTE — LETTER
NOTIFICATION RETURN TO WORK / SCHOOL 
 
10/19/2020 3:14 PM 
 
Mr. Abdelrahman Guzman. 
[address] [place] [address] [facility] [place] [address] To Whom It May Concern: 
 
Abdelrahman Guzman. is currently under the care of [address] [place] [address],[address]. Patient had a normal Cardiac stress test today. Can return to work with no restrictions. If there are questions or concerns please have the patient contact our office. Sincerely, MD Karo Beauchamp

## 2020-10-19 NOTE — PROGRESS NOTES
HISTORY OF PRESENT ILLNESS  Dennise Tapia is a 72 y.o. male. Follow-up of CAD, hypertension, hyperlipidemia and obesity. History of old PCI. Patient denies significant chest pain, SOB, palpitations, edema, dizziness      Hypertension   The history is provided by the medical records. This is a chronic problem. Cholesterol Problem   The history is provided by the medical records. This is a chronic problem. Review of Systems   Constitutional: Negative for chills, fever, malaise/fatigue and weight loss. HENT: Negative for nosebleeds. Eyes: Negative for discharge. Respiratory: Negative for cough and wheezing. Cardiovascular: Negative for palpitations, orthopnea, claudication, leg swelling and PND. Gastrointestinal: Negative for diarrhea, nausea and vomiting. Genitourinary: Negative for dysuria and hematuria. Musculoskeletal: Negative for joint pain. Skin: Negative for rash. Neurological: Negative for dizziness, seizures and loss of consciousness. Endo/Heme/Allergies: Negative for polydipsia. Does not bruise/bleed easily. Psychiatric/Behavioral: Negative for depression and substance abuse. The patient does not have insomnia. Allergies   Allergen Reactions    Plavix [Clopidogrel] Unknown (comments)       Past Medical History:   Diagnosis Date    CAD (coronary artery disease)     Coronary atherosclerosis of native coronary artery     Essential hypertension     Essential hypertension, benign     Obesity, unspecified     Other and unspecified hyperlipidemia     Type II or unspecified type diabetes mellitus without mention of complication, not stated as uncontrolled        Family History   Problem Relation Age of Onset    Heart Attack Neg Hx     Stroke Neg Hx        Social History     Tobacco Use    Smoking status: Never Smoker    Smokeless tobacco: Never Used   Substance Use Topics    Alcohol use: No     Alcohol/week: 0.0 standard drinks    Drug use:  No Current Outpatient Medications   Medication Sig    SITagliptin-metFORMIN (Janumet) 50-1,000 mg per tablet Take 1 Tab by mouth two (2) times daily (with meals).  folic acid/multivit-min/lutein (CENTRUM SILVER PO) Take  by mouth.  amLODIPine-benazepril (LOTREL) 5-20 mg per capsule Take 1 Cap by mouth daily.  OTHER Black seed oil    atorvastatin (LIPITOR) 40 mg tablet Take 1 Tab by mouth daily.  APPLE CIDER VINEGAR PO Take  by mouth.  loratadine (CLARITIN) 10 mg tablet Take 10 mg by mouth.  OMEGA-3 FATTY ACIDS/FISH OIL (OMEGA 3 FISH OIL PO) Take  by mouth.  GARLIC PO Take 20,494 mg by mouth daily.  buffered aspirin (BUFFERIN) 325 mg tablet Take 81 mg by mouth daily. No current facility-administered medications for this visit. Past Surgical History:   Procedure Laterality Date    CARDIAC SURG PROCEDURE UNLIST      HX CORONARY STENT PLACEMENT  2003       Diagnostic Studies:  CARDIOLOGY STUDIES 12/9/2008   Treadmill Stress Test Result WNL   Some recent data might be hidden     NST 10/2018  Nuclear Stress Test   Negative myocardial perfusion imaging. Myocardial perfusion imaging supports a low risk stress test.   There is a prior study available for comparison. As compared to the previous study, there are no significant changes. Normal EF: 50%    Interpretation Summary     · Gated SPECT: Left ventricular function post-stress was normal. Calculated ejection fraction is 49%. · Baseline ECG: Sinus rhythm, PACs. · Left ventricular perfusion is normal.  · Negative myocardial perfusion imaging. Myocardial perfusion imaging supports a low risk stress test.       Visit Vitals  /61 (BP 1 Location: Left arm, BP Patient Position: Sitting)   Pulse (!) 51   Temp 98.2 °F (36.8 °C) (Temporal)   Ht 6' 4\" (1.93 m)   Wt 108.2 kg (238 lb 9.6 oz)   SpO2 97%   BMI 29.04 kg/m²       Mr. Fabby Kilgore has a reminder for a \"due or due soon\" health maintenance.  I have asked that he contact his primary care provider for follow-up on this health maintenance. Physical Exam   Constitutional: He is oriented to person, place, and time. He appears well-developed and well-nourished. No distress. HENT:   Head: Normocephalic and atraumatic. Mouth/Throat: Normal dentition. Eyes: Right eye exhibits no discharge. Left eye exhibits no discharge. No scleral icterus. Neck: Neck supple. No JVD present. Carotid bruit is not present. No thyromegaly present. Cardiovascular: Normal rate, regular rhythm, S1 normal, S2 normal, normal heart sounds and intact distal pulses. Exam reveals no gallop and no friction rub. No murmur heard. Pulmonary/Chest: Effort normal and breath sounds normal. He has no wheezes. He has no rales. Abdominal: Soft. He exhibits no mass. There is no abdominal tenderness. Musculoskeletal:         General: No edema. Lymphadenopathy:        Right cervical: No superficial cervical adenopathy present. Left cervical: No superficial cervical adenopathy present. Neurological: He is alert and oriented to person, place, and time. Skin: Skin is warm and dry. No rash noted. Psychiatric: He has a normal mood and affect. His behavior is normal.   Nursing note and vitals reviewed. ASSESSMENT and PLAN    HLD: LIPID COMPLETE PANEL 2/2020  Cholesterol 133 110 - 200 mg/dL   Triglyceride 118 40 - 149 mg/dL   HDL 38 (L) >=40 mg/dL   Cholesterol/HDL 3.5 0.0 - 5.0    Non-HDL Cholesterol 95 <130 mg/dL   LDL CALCULATION 72 50 - 99 mg/dL   VLDL CALCULATION 24 8 - 30 mg/dL   LDL/HDL Ratio 1.9      8/18 patient losing weight successfully very well. CAD: PCI in 2001 approx    9/2019  Stable CAD. B/P is controlled with current regimen. Lipid panel in 3.19 with LDL 99. He is now taking Atorvastatin 40 mg consistently. Will repeat lipids and increase Atorvastatin if needed for control. 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 10/2020   Stable CAD and controlled b/p. Recent LDL 72, continue Atorvastatin. Continue current medications  Stress test if needed for CDL     Diagnoses and all orders for this visit:    1. Essential hypertension, benign  -     AMB POC EKG ROUTINE W/ 12 LEADS, INTER & REP    2. Atherosclerosis of native coronary artery of native heart without angina pectoris    3. History of coronary artery stent placement  -     EXERCISE CARDIAC STRESS TEST; Future    4. Pure hypercholesterolemia        Pertinent laboratory and test data reviewed and discussed with patient. See patient instructions also for other medical advice given    Medications Discontinued During This Encounter   Medication Reason    amLODIPine (NORVASC) 10 mg tablet     benazepril (LOTENSIN) 40 mg tablet     doxazosin (CARDURA) 1 mg tablet     metFORMIN (GLUCOPHAGE) 1,000 mg tablet      I have independently evaluated and examined the patient. Patient is asymptomatic. Physical exam is unremarkable and without any significant murmurs rubs gallops or signs of CHF. He is mildly overweight and it was discussed with him. He was not very regular with statins but now takes them regularly. Lipids are now controlled well  Diet weight and exercise discussed. Regular stress test done for CDL clearance at the request of the patient. All relevant labs and testing data's are reviewed. Care plan discussed and updated after review.   Flory Mandel NP

## 2021-10-18 ENCOUNTER — OFFICE VISIT (OUTPATIENT)
Dept: CARDIOLOGY CLINIC | Age: 71
End: 2021-10-18
Payer: MEDICARE

## 2021-10-18 VITALS
OXYGEN SATURATION: 95 % | WEIGHT: 249 LBS | SYSTOLIC BLOOD PRESSURE: 135 MMHG | HEIGHT: 76 IN | HEART RATE: 69 BPM | DIASTOLIC BLOOD PRESSURE: 72 MMHG | BODY MASS INDEX: 30.32 KG/M2

## 2021-10-18 DIAGNOSIS — I25.10 ATHEROSCLEROSIS OF NATIVE CORONARY ARTERY OF NATIVE HEART WITHOUT ANGINA PECTORIS: Primary | ICD-10-CM

## 2021-10-18 DIAGNOSIS — Z95.5 HISTORY OF CORONARY ARTERY STENT PLACEMENT: ICD-10-CM

## 2021-10-18 DIAGNOSIS — E78.00 PURE HYPERCHOLESTEROLEMIA: ICD-10-CM

## 2021-10-18 DIAGNOSIS — I10 ESSENTIAL HYPERTENSION, BENIGN: ICD-10-CM

## 2021-10-18 PROCEDURE — 99214 OFFICE O/P EST MOD 30 MIN: CPT | Performed by: INTERNAL MEDICINE

## 2021-10-18 PROCEDURE — 3017F COLORECTAL CA SCREEN DOC REV: CPT | Performed by: INTERNAL MEDICINE

## 2021-10-18 PROCEDURE — G8432 DEP SCR NOT DOC, RNG: HCPCS | Performed by: INTERNAL MEDICINE

## 2021-10-18 PROCEDURE — G8752 SYS BP LESS 140: HCPCS | Performed by: INTERNAL MEDICINE

## 2021-10-18 PROCEDURE — G8754 DIAS BP LESS 90: HCPCS | Performed by: INTERNAL MEDICINE

## 2021-10-18 PROCEDURE — G8417 CALC BMI ABV UP PARAM F/U: HCPCS | Performed by: INTERNAL MEDICINE

## 2021-10-18 PROCEDURE — 1101F PT FALLS ASSESS-DOCD LE1/YR: CPT | Performed by: INTERNAL MEDICINE

## 2021-10-18 PROCEDURE — G8427 DOCREV CUR MEDS BY ELIG CLIN: HCPCS | Performed by: INTERNAL MEDICINE

## 2021-10-18 PROCEDURE — G8536 NO DOC ELDER MAL SCRN: HCPCS | Performed by: INTERNAL MEDICINE

## 2021-10-18 RX ORDER — ATORVASTATIN CALCIUM 40 MG/1
40 TABLET, FILM COATED ORAL DAILY
Qty: 90 TABLET | Refills: 3 | Status: SHIPPED | OUTPATIENT
Start: 2021-10-18 | End: 2022-09-27

## 2021-10-18 NOTE — PROGRESS NOTES
1. Have you been to the ER, urgent care clinic since your last visit? Hospitalized since your last visit? No   2. Have you seen or consulted any other health care providers outside of the [address] [place] [address] since your last visit? Include any pap smears or colon screening. No     3. Since your last visit, have you had any of the following symptoms? None    4. Have you had any blood work, X-rays or cardiac testing? No     Requested: NO     In [facility]: YES    5. Where do you normally have your labs drawn? [facility]    6. Do you need any refills today?    No

## 2021-10-18 NOTE — PROGRESS NOTES
HISTORY OF PRESENT ILLNESS  Josefa Stock is a 80 y.o. male. Follow-up of CAD, hypertension, hyperlipidemia and obesity. History of old PCI. Patient denies significant chest pain, SOB, palpitations, edema, dizziness      Follow-up  Pertinent negatives include no chest pain, no headaches and no shortness of breath. Review of Systems   Constitutional: Negative for chills, fever, malaise/fatigue and weight loss. HENT: Negative for nosebleeds. Eyes: Negative for discharge. Respiratory: Negative for cough, shortness of breath and wheezing. Cardiovascular: Negative for chest pain, palpitations, orthopnea, claudication, leg swelling and PND. Gastrointestinal: Negative for diarrhea, nausea and vomiting. Genitourinary: Negative for dysuria and hematuria. Musculoskeletal: Negative for joint pain. Skin: Negative for rash. Neurological: Negative for dizziness, seizures, loss of consciousness and headaches. Endo/Heme/Allergies: Negative for polydipsia. Does not bruise/bleed easily. Psychiatric/Behavioral: Negative for depression and substance abuse. The patient does not have insomnia.       Allergies   Allergen Reactions    Plavix [Clopidogrel] Unknown (comments)       Past Medical History:   Diagnosis Date    CAD (coronary artery disease)     Coronary atherosclerosis of native coronary artery     Essential hypertension     Essential hypertension, benign     Obesity, unspecified     Other and unspecified hyperlipidemia     Type II or unspecified type diabetes mellitus without mention of complication, not stated as uncontrolled        Family History   Problem Relation Age of Onset    Heart Attack Neg Hx     Stroke Neg Hx        Social History     Tobacco Use    Smoking status: Never Smoker    Smokeless tobacco: Never Used   Substance Use Topics    Alcohol use: No     Alcohol/week: 0.0 standard drinks    Drug use: No        Current Outpatient Medications   Medication Sig    SITagliptin-metFORMIN (Janumet) 50-1,000 mg per tablet Take 1 Tab by mouth two (2) times daily (with meals).  folic acid/multivit-min/lutein (CENTRUM SILVER PO) Take  by mouth.  amLODIPine-benazepril (LOTREL) 5-20 mg per capsule Take 1 Cap by mouth daily.  OTHER Black seed oil    APPLE CIDER VINEGAR PO Take  by mouth.  loratadine (CLARITIN) 10 mg tablet Take 10 mg by mouth.  OMEGA-3 FATTY ACIDS/FISH OIL (OMEGA 3 FISH OIL PO) Take  by mouth.  GARLIC PO Take 17,926 mg by mouth daily.  buffered aspirin (BUFFERIN) 325 mg tablet Take 81 mg by mouth daily. No current facility-administered medications for this visit. Past Surgical History:   Procedure Laterality Date    HX CORONARY STENT PLACEMENT  2003    HI CARDIAC SURG PROCEDURE UNLIST         Diagnostic Studies:  No flowsheet data found. NST 10/2018  Nuclear Stress Test   Negative myocardial perfusion imaging. Myocardial perfusion imaging supports a low risk stress test.   There is a prior study available for comparison. As compared to the previous study, there are no significant changes. Normal EF: 50%    Interpretation Summary     · Gated SPECT: Left ventricular function post-stress was normal. Calculated ejection fraction is 49%. · Baseline ECG: Sinus rhythm, PACs. · Left ventricular perfusion is normal.  · Negative myocardial perfusion imaging. Myocardial perfusion imaging supports a low risk stress test.       Visit Vitals  /72 (BP 1 Location: Left upper arm, BP Patient Position: Sitting, BP Cuff Size: Adult)   Pulse 69   Ht 6' 4\" (1.93 m)   Wt 112.9 kg (249 lb)   SpO2 95%   BMI 30.31 kg/m²       Mr. Anatoly Salgado has a reminder for a \"due or due soon\" health maintenance. I have asked that he contact his primary care provider for follow-up on this health maintenance. Physical Exam  Vitals and nursing note reviewed. Constitutional:       General: He is not in acute distress. Appearance: He is well-developed.    HENT: Head: Normocephalic and atraumatic. Mouth/Throat:      Dentition: Normal dentition. Eyes:      General: No scleral icterus. Right eye: No discharge. Left eye: No discharge. Neck:      Thyroid: No thyromegaly. Vascular: No carotid bruit or JVD. Cardiovascular:      Rate and Rhythm: Normal rate and regular rhythm. Pulses: Intact distal pulses. Heart sounds: Normal heart sounds, S1 normal and S2 normal. No murmur heard. No friction rub. No gallop. Pulmonary:      Effort: Pulmonary effort is normal.      Breath sounds: Normal breath sounds. No wheezing or rales. Abdominal:      Palpations: Abdomen is soft. There is no mass. Tenderness: There is no abdominal tenderness. Musculoskeletal:      Cervical back: Neck supple. Right lower leg: No edema. Left lower leg: No edema. Lymphadenopathy:      Cervical:      Right cervical: No superficial cervical adenopathy. Left cervical: No superficial cervical adenopathy. Skin:     General: Skin is warm and dry. Findings: No rash. Neurological:      Mental Status: He is alert and oriented to person, place, and time. Psychiatric:         Behavior: Behavior normal.         ASSESSMENT and PLAN    HLD: LIPID COMPLETE PANEL 2/2020  Cholesterol 133 110 - 200 mg/dL   Triglyceride 118 40 - 149 mg/dL   HDL 38 (L) >=40 mg/dL   Cholesterol/HDL 3.5 0.0 - 5.0    Non-HDL Cholesterol 95 <130 mg/dL   LDL CALCULATION 72 50 - 99 mg/dL   VLDL CALCULATION 24 8 - 30 mg/dL   LDL/HDL Ratio 1.9      8/18 patient losing weight successfully very well. CAD: PCI in 2001 approx    9/2019  Stable CAD. B/P is controlled with current regimen. Lipid panel in 3.19 with LDL 99. He is now taking Atorvastatin 40 mg consistently. Will repeat lipids and increase Atorvastatin if needed for control. 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 10/2020   Stable CAD and controlled b/p. Recent LDL 72, continue Atorvastatin.     Continue current medications  Stress test if needed for CDL       Diagnoses and all orders for this visit:    1. Atherosclerosis of native coronary artery of native heart without angina pectoris    2. History of coronary artery stent placement    3. Essential hypertension, benign  -     LIPID PANEL; Future  -     HEPATIC FUNCTION PANEL; Future  -     METABOLIC PANEL, BASIC; Future    4. Pure hypercholesterolemia  -     atorvastatin (LIPITOR) 40 mg tablet; Take 1 Tablet by mouth daily.  -     LIPID PANEL; Future        Pertinent laboratory and test data reviewed and discussed with patient. See patient instructions also for other medical advice given    Medications Discontinued During This Encounter   Medication Reason    atorvastatin (LIPITOR) 40 mg tablet Not A Current Medication       Follow-up and Dispositions    · Return in about 6 months (around 4/18/2022), or if symptoms worsen or fail to improve, for post test.       10/18/2021 CAD stable. Blood pressure is controlled. Somehow he is missing statins. Represcribed Lipitor and ordered the labs. Healthy lifestyle discussed. Mediterranean diet guidelines printed.

## 2021-10-18 NOTE — PATIENT INSTRUCTIONS
Medications Discontinued During This Encounter   Medication Reason    atorvastatin (LIPITOR) 40 mg tablet Not A Current Medication          Learning About the Mediterranean Diet  What is the Mediterranean diet? The Mediterranean diet is a style of eating rather than a diet plan. It features foods eaten in [place], [place], [place] and [place], and other countries along the [facility]. It emphasizes eating foods like fish, fruits, vegetables, beans, high-fiber breads and whole grains, nuts, and olive oil. This style of eating includes limited red meat, cheese, and sweets. Why choose the Mediterranean diet? A Mediterranean-style diet may improve heart health. It contains more fat than other heart-healthy diets. But the fats are mainly from nuts, unsaturated oils (such as fish oils and olive oil), and certain nut or seed oils (such as canola, soybean, or flaxseed oil). These fats may help protect the heart and blood vessels. How can you get started on the Mediterranean diet? Here are some things you can do to switch to a more Mediterranean way of eating. What to eat  · Eat a variety of fruits and vegetables each day, such as grapes, blueberries, tomatoes, broccoli, peppers, figs, olives, spinach, eggplant, beans, lentils, and chickpeas. · Eat a variety of whole-grain foods each day, such as oats, brown rice, and whole wheat bread, pasta, and couscous. · Eat fish at least 2 times a week. Try tuna, salmon, mackerel, lake trout, herring, or sardines. · Eat moderate amounts of low-fat dairy products, such as milk, cheese, or yogurt. · Eat moderate amounts of poultry and eggs. · Choose healthy (unsaturated) fats, such as nuts, olive oil, and certain nut or seed oils like canola, soybean, and flaxseed. · Limit unhealthy (saturated) fats, such as butter, palm oil, and coconut oil. And limit fats found in animal products, such as meat and dairy products made with whole milk.  Try to eat red meat only a few times a month in very small amounts. · Limit sweets and desserts to only a few times a week. This includes sugar-sweetened drinks like soda. The Mediterranean diet may also include red wine with your meal--1 glass each day for women and up to 2 glasses a day for men. Tips for eating at home  · Use herbs, spices, garlic, lemon zest, and citrus juice instead of salt to add flavor to foods. · Add avocado slices to your sandwich instead of burgos. · Have fish for lunch or dinner instead of red meat. Brush the fish with olive oil, and broil or grill it. · Sprinkle your salad with seeds or nuts instead of cheese. · Cook with olive or canola oil instead of butter or oils that are high in saturated fat. · Switch from 2% milk or whole milk to 1% or fat-free milk. · Dip raw vegetables in a vinaigrette dressing or hummus instead of dips made from mayonnaise or sour cream.  · Have a piece of fruit for dessert instead of a piece of cake. Try baked apples, or have some dried fruit. Tips for eating out  · Try broiled, grilled, baked, or poached fish instead of having it fried or breaded. · Ask your  to have your meals prepared with olive oil instead of butter. · Order dishes made with marinara sauce or sauces made from olive oil. Avoid sauces made from cream or mayonnaise. · Choose whole-grain breads, whole wheat pasta and pizza crust, brown rice, beans, and lentils. · Cut back on butter or margarine on bread. Instead, you can dip your bread in a small amount of olive oil. · Ask for a side salad or grilled vegetables instead of french fries or chips. Where can you learn more? Go to http://www.[employer].com/  Enter O407 in the search box to learn more about \"Learning About the Mediterranean Diet. \"  Current as of: December 17, 2020               Content Version: 13.0  © 3296-5123 Healthwise, [employer].    Care instructions adapted under license by [employer] (which disclaims liability or warranty for this information). If you have questions about a medical condition or this instruction, always ask your healthcare professional. [address] any warranty or liability for your use of this information.

## 2022-03-18 PROBLEM — Z95.5 HISTORY OF CORONARY ARTERY STENT PLACEMENT: Status: ACTIVE | Noted: 2017-04-13

## 2022-03-19 PROBLEM — R60.9 EDEMA: Status: ACTIVE | Noted: 2017-04-13

## 2022-03-19 PROBLEM — Z01.810 PREOP CARDIOVASCULAR EXAM: Status: ACTIVE | Noted: 2018-02-02

## 2022-06-30 ENCOUNTER — OFFICE VISIT (OUTPATIENT)
Dept: CARDIOLOGY CLINIC | Age: 72
End: 2022-06-30
Payer: MEDICARE

## 2022-06-30 VITALS
HEART RATE: 69 BPM | OXYGEN SATURATION: 95 % | DIASTOLIC BLOOD PRESSURE: 61 MMHG | SYSTOLIC BLOOD PRESSURE: 120 MMHG | BODY MASS INDEX: 29.22 KG/M2 | WEIGHT: 240 LBS | HEIGHT: 76 IN

## 2022-06-30 DIAGNOSIS — I10 ESSENTIAL HYPERTENSION, BENIGN: ICD-10-CM

## 2022-06-30 DIAGNOSIS — E78.00 PURE HYPERCHOLESTEROLEMIA: ICD-10-CM

## 2022-06-30 DIAGNOSIS — Z95.5 HISTORY OF CORONARY ARTERY STENT PLACEMENT: ICD-10-CM

## 2022-06-30 DIAGNOSIS — I25.10 ATHEROSCLEROSIS OF NATIVE CORONARY ARTERY OF NATIVE HEART WITHOUT ANGINA PECTORIS: Primary | ICD-10-CM

## 2022-06-30 PROCEDURE — 3017F COLORECTAL CA SCREEN DOC REV: CPT | Performed by: INTERNAL MEDICINE

## 2022-06-30 PROCEDURE — G8752 SYS BP LESS 140: HCPCS | Performed by: INTERNAL MEDICINE

## 2022-06-30 PROCEDURE — G8432 DEP SCR NOT DOC, RNG: HCPCS | Performed by: INTERNAL MEDICINE

## 2022-06-30 PROCEDURE — G8536 NO DOC ELDER MAL SCRN: HCPCS | Performed by: INTERNAL MEDICINE

## 2022-06-30 PROCEDURE — G8427 DOCREV CUR MEDS BY ELIG CLIN: HCPCS | Performed by: INTERNAL MEDICINE

## 2022-06-30 PROCEDURE — G8754 DIAS BP LESS 90: HCPCS | Performed by: INTERNAL MEDICINE

## 2022-06-30 PROCEDURE — 1123F ACP DISCUSS/DSCN MKR DOCD: CPT | Performed by: INTERNAL MEDICINE

## 2022-06-30 PROCEDURE — G8417 CALC BMI ABV UP PARAM F/U: HCPCS | Performed by: INTERNAL MEDICINE

## 2022-06-30 PROCEDURE — 99214 OFFICE O/P EST MOD 30 MIN: CPT | Performed by: INTERNAL MEDICINE

## 2022-06-30 PROCEDURE — 93000 ELECTROCARDIOGRAM COMPLETE: CPT | Performed by: INTERNAL MEDICINE

## 2022-06-30 PROCEDURE — 1101F PT FALLS ASSESS-DOCD LE1/YR: CPT | Performed by: INTERNAL MEDICINE

## 2022-06-30 RX ORDER — METFORMIN HYDROCHLORIDE 1000 MG/1
1000 TABLET ORAL 2 TIMES DAILY WITH MEALS
COMMUNITY

## 2022-06-30 NOTE — PATIENT INSTRUCTIONS
Medications Discontinued During This Encounter   Medication Reason    SITagliptin-metFORMIN (Janumet) 50-1,000 mg per tablet Alternate Therapy        Learning About the Mediterranean Diet  What is the Mediterranean diet? The Mediterranean diet is a style of eating rather than a diet plan. It features foods eaten in [place], [place], [place] and [place], and other countries along the [facility]. It emphasizes eating foods like fish, fruits, vegetables, beans, high-fiber breads and whole grains, nuts, and olive oil. This style of eating includes limited red meat, cheese, and sweets. Why choose the Mediterranean diet? A Mediterranean-style diet may improve heart health. It contains more fat than other heart-healthy diets. But the fats are mainly from nuts, unsaturated oils (such as fish oils and olive oil), and certain nut or seed oils (such as canola, soybean, or flaxseed oil). These fats may help protect the heart and blood vessels. How can you get started on the Mediterranean diet? Here are some things you can do to switch to a more Mediterranean way of eating. What to eat  · Eat a variety of fruits and vegetables each day, such as grapes, blueberries, tomatoes, broccoli, peppers, figs, olives, spinach, eggplant, beans, lentils, and chickpeas. · Eat a variety of whole-grain foods each day, such as oats, brown rice, and whole wheat bread, pasta, and couscous. · Eat fish at least 2 times a week. Try tuna, salmon, mackerel, lake trout, herring, or sardines. · Eat moderate amounts of low-fat dairy products, such as milk, cheese, or yogurt. · Eat moderate amounts of poultry and eggs. · Choose healthy (unsaturated) fats, such as nuts, olive oil, and certain nut or seed oils like canola, soybean, and flaxseed. · Limit unhealthy (saturated) fats, such as butter, palm oil, and coconut oil. And limit fats found in animal products, such as meat and dairy products made with whole milk.  Try to eat red meat only a few times a month in very small amounts. · Limit sweets and desserts to only a few times a week. This includes sugar-sweetened drinks like soda. The Mediterranean diet may also include red wine with your meal--1 glass each day for women and up to 2 glasses a day for men. Tips for eating at home  · Use herbs, spices, garlic, lemon zest, and citrus juice instead of salt to add flavor to foods. · Add avocado slices to your sandwich instead of burgos. · Have fish for lunch or dinner instead of red meat. Brush the fish with olive oil, and broil or grill it. · Sprinkle your salad with seeds or nuts instead of cheese. · Cook with olive or canola oil instead of butter or oils that are high in saturated fat. · Switch from 2% milk or whole milk to 1% or fat-free milk. · Dip raw vegetables in a vinaigrette dressing or hummus instead of dips made from mayonnaise or sour cream.  · Have a piece of fruit for dessert instead of a piece of cake. Try baked apples, or have some dried fruit. Tips for eating out  · Try broiled, grilled, baked, or poached fish instead of having it fried or breaded. · Ask your  to have your meals prepared with olive oil instead of butter. · Order dishes made with marinara sauce or sauces made from olive oil. Avoid sauces made from cream or mayonnaise. · Choose whole-grain breads, whole wheat pasta and pizza crust, brown rice, beans, and lentils. · Cut back on butter or margarine on bread. Instead, you can dip your bread in a small amount of olive oil. · Ask for a side salad or grilled vegetables instead of french fries or chips. Where can you learn more? Go to http://www.[employer].com/  Enter O407 in the search box to learn more about \"Learning About the Mediterranean Diet. \"  Current as of: September 8, 2021               Content Version: 13.2  © 5837-1241 Healthwise, [facility].    Care instructions adapted under license by [employer] (which disclaims liability or warranty for this information). If you have questions about a medical condition or this instruction, always ask your healthcare professional. [address] any warranty or liability for your use of this information.

## 2022-06-30 NOTE — PROGRESS NOTES
HISTORY OF PRESENT ILLNESS  Ryann Morales is a 71 y.o. male. Follow-up of CAD, hypertension, hyperlipidemia and obesity. History of old PCI. Patient denies significant chest pain, SOB, palpitations, edema, dizziness      Follow-up  Pertinent negatives include no chest pain, no headaches and no shortness of breath. Review of Systems   Constitutional: Negative for chills, fever, malaise/fatigue and weight loss. HENT: Negative for nosebleeds. Eyes: Negative for discharge. Respiratory: Negative for cough, shortness of breath and wheezing. Cardiovascular: Negative for chest pain, palpitations, orthopnea, claudication, leg swelling and PND. Gastrointestinal: Negative for diarrhea, nausea and vomiting. Genitourinary: Negative for dysuria and hematuria. Musculoskeletal: Negative for joint pain. Skin: Negative for rash. Neurological: Negative for dizziness, seizures, loss of consciousness and headaches. Endo/Heme/Allergies: Negative for polydipsia. Does not bruise/bleed easily. Psychiatric/Behavioral: Negative for depression and substance abuse. The patient does not have insomnia.       Allergies   Allergen Reactions    Plavix [Clopidogrel] Unknown (comments)       Past Medical History:   Diagnosis Date    CAD (coronary artery disease)     Coronary atherosclerosis of native coronary artery     Essential hypertension     Essential hypertension, benign     Obesity, unspecified     Other and unspecified hyperlipidemia     Type II or unspecified type diabetes mellitus without mention of complication, not stated as uncontrolled        Family History   Problem Relation Age of Onset    Heart Attack Neg Hx     Stroke Neg Hx        Social History     Tobacco Use    Smoking status: Never Smoker    Smokeless tobacco: Never Used   Substance Use Topics    Alcohol use: No     Alcohol/week: 0.0 standard drinks    Drug use: No        Current Outpatient Medications   Medication Sig    metFORMIN (GLUCOPHAGE) 1,000 mg tablet Take 1,000 mg by mouth two (2) times daily (with meals).  SITagliptin (Januvia) 50 mg tablet Take 50 mg by mouth daily.  atorvastatin (LIPITOR) 40 mg tablet Take 1 Tablet by mouth daily.  folic acid/multivit-min/lutein (CENTRUM SILVER PO) Take  by mouth.  amLODIPine-benazepril (LOTREL) 5-20 mg per capsule Take 1 Cap by mouth daily.  OTHER Black seed oil    APPLE CIDER VINEGAR PO Take  by mouth.  loratadine (CLARITIN) 10 mg tablet Take 10 mg by mouth.  OMEGA-3 FATTY ACIDS/FISH OIL (OMEGA 3 FISH OIL PO) Take  by mouth.  GARLIC PO Take 68,096 mg by mouth daily.  buffered aspirin (BUFFERIN) 325 mg tablet Take 81 mg by mouth daily. No current facility-administered medications for this visit. Past Surgical History:   Procedure Laterality Date    HX CORONARY STENT PLACEMENT  2003    MT CARDIAC SURG PROCEDURE UNLIST         Diagnostic Studies:  No flowsheet data found. NST 10/2018  Nuclear Stress Test   Negative myocardial perfusion imaging. Myocardial perfusion imaging supports a low risk stress test.   There is a prior study available for comparison. As compared to the previous study, there are no significant changes. Normal EF: 50%    Interpretation Summary     · Gated SPECT: Left ventricular function post-stress was normal. Calculated ejection fraction is 49%. · Baseline ECG: Sinus rhythm, PACs. · Left ventricular perfusion is normal.  · Negative myocardial perfusion imaging. Myocardial perfusion imaging supports a low risk stress test.       Visit Vitals  /61   Pulse 69   Ht 6' 4\" (1.93 m)   Wt 108.9 kg (240 lb)   SpO2 95%   BMI 29.21 kg/m²       Mr. Kalyn Hurtado has a reminder for a \"due or due soon\" health maintenance. I have asked that he contact his primary care provider for follow-up on this health maintenance. Physical Exam  Vitals and nursing note reviewed. Constitutional:       General: He is not in acute distress. Appearance: He is well-developed. HENT:      Head: Normocephalic and atraumatic. Mouth/Throat:      Dentition: Normal dentition. Eyes:      General: No scleral icterus. Right eye: No discharge. Left eye: No discharge. Neck:      Thyroid: No thyromegaly. Vascular: No carotid bruit or JVD. Cardiovascular:      Rate and Rhythm: Normal rate and regular rhythm. Pulses: Intact distal pulses. Heart sounds: Normal heart sounds, S1 normal and S2 normal. No murmur heard. No friction rub. No gallop. Pulmonary:      Effort: Pulmonary effort is normal.      Breath sounds: Normal breath sounds. No wheezing or rales. Abdominal:      Palpations: Abdomen is soft. There is no mass. Tenderness: There is no abdominal tenderness. Musculoskeletal:      Cervical back: Neck supple. Right lower leg: No edema. Left lower leg: No edema. Lymphadenopathy:      Cervical:      Right cervical: No superficial cervical adenopathy. Left cervical: No superficial cervical adenopathy. Skin:     General: Skin is warm and dry. Findings: No rash. Neurological:      Mental Status: He is alert and oriented to person, place, and time. Psychiatric:         Behavior: Behavior normal.         ASSESSMENT and PLAN    HLD:   Component 04/26/21 02/11/20 03/20/19 02/01/18 03/06/17 11/07/16   Cholesterol 169 133 173 138 97 Low  117   Triglyceride 59 118 156 High  81 58 72   HDL 64 38 Low  43 49 44 49   Cholesterol/HDL 2.6 3.5 4.0 -- -- --   Non-HDL Cholesterol 105 95 -- -- -- --   LDL CALCULATION 93 72 99 73 41 Low  54   VLDL CALCULATION 12 24 31 High  16 12 14       8/18 patient losing weight successfully very well. CAD: PCI in 2001 approx    9/2019  Stable CAD. B/P is controlled with current regimen. Lipid panel in 3.19 with LDL 99. He is now taking Atorvastatin 40 mg consistently. Will repeat lipids and increase Atorvastatin if needed for control.   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    10/2020   Stable CAD and controlled b/p. Recent LDL 72, continue Atorvastatin. 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 Blood pressure is controlled. Somehow he is missing statins. Represcribed Lipitor and ordered the labs. Healthy lifestyle discussed. Mediterranean diet guidelines printed. Diagnoses and all orders for this visit:    1. Atherosclerosis of native coronary artery of native heart without angina pectoris    2. Essential hypertension, benign  -     AMB POC EKG ROUTINE W/ 12 LEADS, INTER & REP  -     LIPID PANEL; Future  -     HEPATIC FUNCTION PANEL; Future  -     METABOLIC PANEL, BASIC; Future    3. History of coronary artery stent placement    4. Pure hypercholesterolemia        Pertinent laboratory and test data reviewed and discussed with patient. See patient instructions also for other medical advice given    Medications Discontinued During This Encounter   Medication Reason    SITagliptin-metFORMIN (Janumet) 50-1,000 mg per tablet Alternate Therapy       Follow-up and Dispositions    · Return in about 6 months (around 12/30/2022), or if symptoms worsen or fail to improve, for post test.       6/30/2022 CAD is clinically stable. Blood pressure mildly elevated initially. Repeat blood pressure is excellent at 120/61  Check labs as ordered. Diabetes is not controlled and this was brought to patient's attention. Told him to lose weight to 225 pounds goal.  Healthy diets discussed and Mediterranean diet guidelines given.

## 2022-06-30 NOTE — PROGRESS NOTES
1. Have you been to the ER, urgent care clinic since your last visit? Hospitalized since your last visit? No    2. Have you seen or consulted any other health care providers outside of the [address] [place] [address] since your last visit? Include any pap smears or colon screening. No    3. Since your last visit, have you had any of the following symptoms? No    4. Have you had any blood work, X-rays or cardiac testing? Yes When: 4/2022 Where: [facility]     Requested: NO     In [facility]: YES    5. Where do you normally have your labs drawn? [facility]    6. Do you need any refills today?    Unsure

## 2022-09-26 DIAGNOSIS — E78.00 PURE HYPERCHOLESTEROLEMIA: ICD-10-CM

## 2022-09-27 RX ORDER — ATORVASTATIN CALCIUM 40 MG/1
TABLET, FILM COATED ORAL
Qty: 90 TABLET | Refills: 0 | Status: SHIPPED | OUTPATIENT
Start: 2022-09-27

## 2022-11-10 ENCOUNTER — TELEPHONE (OUTPATIENT)
Dept: CARDIOLOGY CLINIC | Age: 72
End: 2022-11-10

## 2023-02-09 ENCOUNTER — OFFICE VISIT (OUTPATIENT)
Dept: CARDIOLOGY CLINIC | Age: 73
End: 2023-02-09
Payer: MEDICARE

## 2023-02-09 VITALS
OXYGEN SATURATION: 98 % | SYSTOLIC BLOOD PRESSURE: 151 MMHG | WEIGHT: 246 LBS | HEART RATE: 68 BPM | HEIGHT: 76 IN | BODY MASS INDEX: 29.96 KG/M2 | DIASTOLIC BLOOD PRESSURE: 79 MMHG

## 2023-02-09 DIAGNOSIS — Z95.5 HISTORY OF CORONARY ARTERY STENT PLACEMENT: ICD-10-CM

## 2023-02-09 DIAGNOSIS — I10 ESSENTIAL HYPERTENSION, BENIGN: ICD-10-CM

## 2023-02-09 DIAGNOSIS — E78.00 PURE HYPERCHOLESTEROLEMIA: ICD-10-CM

## 2023-02-09 DIAGNOSIS — I25.10 ATHEROSCLEROSIS OF NATIVE CORONARY ARTERY OF NATIVE HEART WITHOUT ANGINA PECTORIS: Primary | ICD-10-CM

## 2023-02-09 PROCEDURE — G8417 CALC BMI ABV UP PARAM F/U: HCPCS | Performed by: INTERNAL MEDICINE

## 2023-02-09 PROCEDURE — G8427 DOCREV CUR MEDS BY ELIG CLIN: HCPCS | Performed by: INTERNAL MEDICINE

## 2023-02-09 PROCEDURE — 99214 OFFICE O/P EST MOD 30 MIN: CPT | Performed by: INTERNAL MEDICINE

## 2023-02-09 PROCEDURE — G8536 NO DOC ELDER MAL SCRN: HCPCS | Performed by: INTERNAL MEDICINE

## 2023-02-09 PROCEDURE — 1123F ACP DISCUSS/DSCN MKR DOCD: CPT | Performed by: INTERNAL MEDICINE

## 2023-02-09 PROCEDURE — G8432 DEP SCR NOT DOC, RNG: HCPCS | Performed by: INTERNAL MEDICINE

## 2023-02-09 PROCEDURE — 3017F COLORECTAL CA SCREEN DOC REV: CPT | Performed by: INTERNAL MEDICINE

## 2023-02-09 PROCEDURE — 3078F DIAST BP <80 MM HG: CPT | Performed by: INTERNAL MEDICINE

## 2023-02-09 PROCEDURE — 1101F PT FALLS ASSESS-DOCD LE1/YR: CPT | Performed by: INTERNAL MEDICINE

## 2023-02-09 PROCEDURE — 3077F SYST BP >= 140 MM HG: CPT | Performed by: INTERNAL MEDICINE

## 2023-02-09 RX ORDER — AMLODIPINE AND BENAZEPRIL HYDROCHLORIDE 5; 40 MG/1; MG/1
1 CAPSULE ORAL DAILY
Qty: 90 CAPSULE | Refills: 1 | Status: SHIPPED | OUTPATIENT
Start: 2023-02-09

## 2023-02-09 NOTE — PROGRESS NOTES
HISTORY OF PRESENT ILLNESS  King Zuniga is a 71 y.o. male. Follow-up of CAD, hypertension, hyperlipidemia and obesity. History of old PCI. Patient denies significant chest pain, SOB, palpitations, edema, dizziness      Follow-up  Pertinent negatives include no chest pain, no headaches and no shortness of breath. Review of Systems   Constitutional:  Negative for chills, fever, malaise/fatigue and weight loss. HENT:  Negative for nosebleeds. Eyes:  Negative for discharge. Respiratory:  Negative for cough, shortness of breath and wheezing. Cardiovascular:  Negative for chest pain, palpitations, orthopnea, claudication, leg swelling and PND. Gastrointestinal:  Negative for diarrhea, nausea and vomiting. Genitourinary:  Negative for dysuria and hematuria. Musculoskeletal:  Negative for joint pain. Skin:  Negative for rash. Neurological:  Negative for dizziness, seizures, loss of consciousness and headaches. Endo/Heme/Allergies:  Negative for polydipsia. Does not bruise/bleed easily. Psychiatric/Behavioral:  Negative for depression and substance abuse. The patient does not have insomnia.     Allergies   Allergen Reactions    Plavix [Clopidogrel] Unknown (comments)       Past Medical History:   Diagnosis Date    CAD (coronary artery disease)     Coronary atherosclerosis of native coronary artery     Essential hypertension     Essential hypertension, benign     Obesity, unspecified     Other and unspecified hyperlipidemia     Type II or unspecified type diabetes mellitus without mention of complication, not stated as uncontrolled        Family History   Problem Relation Age of Onset    Heart Attack Neg Hx     Stroke Neg Hx        Social History     Tobacco Use    Smoking status: Never    Smokeless tobacco: Never   Substance Use Topics    Alcohol use: No     Alcohol/week: 0.0 standard drinks    Drug use: No        Current Outpatient Medications   Medication Sig    atorvastatin (LIPITOR) 40 mg tablet Take 1 tablet by mouth once daily    metFORMIN (GLUCOPHAGE) 1,000 mg tablet Take 1,000 mg by mouth daily.  SITagliptin phosphate (JANUVIA) 50 mg tablet Take 50 mg by mouth daily.  folic acid/multivit-min/lutein (CENTRUM SILVER PO) Take  by mouth.  amLODIPine-benazepril (LOTREL) 5-20 mg per capsule Take 1 Cap by mouth daily.  APPLE CIDER VINEGAR PO Take  by mouth.  loratadine (CLARITIN) 10 mg tablet Take 10 mg by mouth.  OMEGA-3 FATTY ACIDS/FISH OIL (OMEGA 3 FISH OIL PO) Take  by mouth. 2 tabs    GARLIC PO Take 32,602 mg by mouth daily.  aspirin/calcium carb/magnesium (ASPIRIN,BUFFD-CALCIUM CARB-MAG PO) Take 81 mg by mouth daily. No current facility-administered medications for this visit. Past Surgical History:   Procedure Laterality Date    HX CORONARY STENT PLACEMENT  2003    [place] CARDIAC SURG PROCEDURE UNLIST         Diagnostic Studies:  No flowsheet data found. NST 10/2018  Nuclear Stress Test   Negative myocardial perfusion imaging. Myocardial perfusion imaging supports a low risk stress test.   There is a prior study available for comparison. As compared to the previous study, there are no significant changes. Normal EF: 50%    Interpretation Summary     Gated SPECT: Left ventricular function post-stress was normal. Calculated ejection fraction is 49%. Baseline ECG: Sinus rhythm, PACs. Left ventricular perfusion is normal.  Negative myocardial perfusion imaging. Myocardial perfusion imaging supports a low risk stress test.       Visit Vitals  BP (!) 151/79   Pulse 68   Ht 6' 4\" (1.93 m)   Wt 111.6 kg (246 lb)   SpO2 98%   BMI 29.94 kg/m²       Mr. Altagracia Whipple has a reminder for a \"due or due soon\" health maintenance. I have asked that he contact his primary care provider for follow-up on this health maintenance. Physical Exam  Vitals and nursing note reviewed. Constitutional:       General: He is not in acute distress.      Appearance: He is well-developed. HENT:      Head: Normocephalic and atraumatic. Mouth/Throat:      Dentition: Normal dentition. Eyes:      General: No scleral icterus. Right eye: No discharge. Left eye: No discharge. Neck:      Thyroid: No thyromegaly. Vascular: No carotid bruit or JVD. Cardiovascular:      Rate and Rhythm: Normal rate and regular rhythm. Pulses: Intact distal pulses. Heart sounds: Normal heart sounds, S1 normal and S2 normal. No murmur heard. No friction rub. No gallop. Pulmonary:      Effort: Pulmonary effort is normal.      Breath sounds: Normal breath sounds. No wheezing or rales. Abdominal:      Palpations: Abdomen is soft. There is no mass. Tenderness: There is no abdominal tenderness. Musculoskeletal:      Cervical back: Neck supple. Right lower leg: No edema. Left lower leg: No edema. Lymphadenopathy:      Cervical:      Right cervical: No superficial cervical adenopathy. Left cervical: No superficial cervical adenopathy. Skin:     General: Skin is warm and dry. Findings: No rash. Neurological:      Mental Status: He is alert and oriented to person, place, and time. Psychiatric:         Behavior: Behavior normal.       ASSESSMENT and PLAN    HLD:   Component 07/26/22 04/26/21 02/11/20 03/20/19 02/01/18 03/06/17   Cholesterol 104 Low  169 133 173 138 97 Low    Triglyceride 62 59 118 156 High  81 58   HDL 50 64 38 Low  43 49 44   Cholesterol/HDL 2.1 2.6 3.5 4.0 -- --   Non-HDL Cholesterol 54 105 95 -- -- --   LDL CALCULATION 42 Low  93 72 99 73 41 Low    VLDL CALCULATION 12 12 24 31 High  16 12       8/18 patient losing weight successfully very well. CAD: PCI in 2001 approx    9/2019  Stable CAD. B/P is controlled with current regimen. Lipid panel in 3.19 with LDL 99. He is now taking Atorvastatin 40 mg consistently. Will repeat lipids and increase Atorvastatin if needed for control.   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    10/2020   Stable CAD and controlled b/p. Recent LDL 72, continue Atorvastatin. 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 Blood pressure is controlled. Somehow he is missing statins. Represcribed Lipitor and ordered the labs. Healthy lifestyle discussed. Mediterranean diet guidelines printed. 6/30/2022 CAD is clinically stable. Blood pressure mildly elevated initially. Repeat blood pressure is excellent at 120/61  Check labs as ordered. Diabetes is not controlled and this was brought to patient's attention. Told him to lose weight to 225 pounds goal.  Healthy diets discussed and Mediterranean diet guidelines given. Diagnoses and all orders for this visit:    1. Atherosclerosis of native coronary artery of native heart without angina pectoris    2. Essential hypertension, benign    3. Pure hypercholesterolemia    4. History of coronary artery stent placement    Other orders  -     amLODIPine-benazepril (LOTREL) 5-40 mg per capsule; Take 1 Capsule by mouth daily. Pertinent laboratory and test data reviewed and discussed with patient. See patient instructions also for other medical advice given    Medications Discontinued During This Encounter   Medication Reason    OTHER Therapy Completed    amLODIPine-benazepril (LOTREL) 5-20 mg per capsule DOSE ADJUSTMENT       Follow-up and Dispositions    Return in about 1 year (around 2/9/2024), or if symptoms worsen or fail to improve, for with ekg. 2/9/2023 CAD is clinically stable. Blood pressure is elevated. Increase the dose of Lotrel to 5/40 and follow the home chart. Lipids are controlled well as of 7/22  Healthy habits discussed. Encouraged to walk regularly. He may consider to join a yoga. Mediterranean diet guidelines given.

## 2023-02-09 NOTE — PATIENT INSTRUCTIONS
Medications Discontinued During This Encounter   Medication Reason    OTHER Therapy Completed    amLODIPine-benazepril (LOTREL) 5-20 mg per capsule DOSE ADJUSTMENT       After the recommended changes have been made in blood pressure medicines, patient advised to keep BP/HR(pulse rate) chart twice daily and bring us results in next 4 to 5 days. Patient may send the results via \"My Chart\" if desired. Please rest for 5-10 minutes before checking blood pressure. Sit on a comfortable chair without crossing the legs and put your arm on a table. We recommend that you use an upper arm cuff. Check the blood pressure 3 times each time you check the blood pressure and record the lowest reading. If you check the blood pressure in both arms, use the higher reading. Learning About the [facility] Diet  What is the Mediterranean diet? The Mediterranean diet is a style of eating rather than a diet plan. It features foods eaten in [place], [place], [place] and [place], and other countries along the [facility]. It emphasizes eating foods like fish, fruits, vegetables, beans, high-fiber breads and whole grains, nuts, and olive oil. This style of eating includes limited red meat, cheese, and sweets. Why choose the Mediterranean diet? A Mediterranean-style diet may improve heart health. It contains more fat than other heart-healthy diets. But the fats are mainly from nuts, unsaturated oils (such as fish oils and olive oil), and certain nut or seed oils (such as canola, soybean, or flaxseed oil). These fats may help protect the heart and blood vessels. How can you get started on the Mediterranean diet? Here are some things you can do to switch to a more Mediterranean way of eating. What to eat  Eat a variety of fruits and vegetables each day, such as grapes, blueberries, tomatoes, broccoli, peppers, figs, olives, spinach, eggplant, beans, lentils, and chickpeas.   Eat a variety of whole-grain foods each day, such as oats, brown rice, and whole wheat bread, pasta, and couscous. Eat fish at least 2 times a week. Try tuna, salmon, mackerel, lake trout, herring, or sardines. Eat moderate amounts of low-fat dairy products, such as milk, cheese, or yogurt. Eat moderate amounts of poultry and eggs. Choose healthy (unsaturated) fats, such as nuts, olive oil, and certain nut or seed oils like canola, soybean, and flaxseed. Limit unhealthy (saturated) fats, such as butter, palm oil, and coconut oil. And limit fats found in animal products, such as meat and dairy products made with whole milk. Try to eat red meat only a few times a month in very small amounts. Limit sweets and desserts to only a few times a week. This includes sugar-sweetened drinks like soda. The Mediterranean diet may also include red wine with your meal--1 glass each day for women and up to 2 glasses a day for men. Tips for eating at home  Use herbs, spices, garlic, lemon zest, and citrus juice instead of salt to add flavor to foods. Add avocado slices to your sandwich instead of burgos. Have fish for lunch or dinner instead of red meat. Brush the fish with olive oil, and broil or grill it. Sprinkle your salad with seeds or nuts instead of cheese. Cook with olive or canola oil instead of butter or oils that are high in saturated fat. Switch from 2% milk or whole milk to 1% or fat-free milk. Dip raw vegetables in a vinaigrette dressing or hummus instead of dips made from mayonnaise or sour cream.  Have a piece of fruit for dessert instead of a piece of cake. Try baked apples, or have some dried fruit. Tips for eating out  Try broiled, grilled, baked, or poached fish instead of having it fried or breaded. Ask your  to have your meals prepared with olive oil instead of butter. Order dishes made with marinara sauce or sauces made from olive oil. Avoid sauces made from cream or mayonnaise.   Choose whole-grain breads, whole wheat pasta and pizza crust, brown rice, beans, and lentils. Cut back on butter or margarine on bread. Instead, you can dip your bread in a small amount of olive oil. Ask for a side salad or grilled vegetables instead of french fries or chips. Where can you learn more? Go to http://www.correia.com/  Enter O407 in the search box to learn more about \"Learning About the Mediterranean Diet. \"  Current as of: May 9, 2022               Content Version: 13.4  © 2006-2022 [employer]wise, [employer]. Care instructions adapted under license by [employer] (which disclaims liability or warranty for this information). If you have questions about a medical condition or this instruction, always ask your healthcare professional. [address] any warranty or liability for your use of this information.

## 2023-05-24 RX ORDER — ATORVASTATIN CALCIUM 40 MG/1
TABLET, FILM COATED ORAL
Qty: 90 TABLET | Refills: 0 | Status: SHIPPED | OUTPATIENT
Start: 2023-05-24

## 2023-08-03 RX ORDER — ATORVASTATIN CALCIUM 40 MG/1
TABLET, FILM COATED ORAL
Qty: 90 TABLET | Refills: 0 | Status: SHIPPED | OUTPATIENT
Start: 2023-08-03

## 2023-11-03 ENCOUNTER — OFFICE VISIT (OUTPATIENT)
Age: 73
End: 2023-11-03
Payer: MEDICARE

## 2023-11-03 VITALS
WEIGHT: 229 LBS | SYSTOLIC BLOOD PRESSURE: 145 MMHG | OXYGEN SATURATION: 98 % | BODY MASS INDEX: 27.89 KG/M2 | HEART RATE: 64 BPM | DIASTOLIC BLOOD PRESSURE: 73 MMHG | HEIGHT: 76 IN

## 2023-11-03 DIAGNOSIS — I25.2 OLD MYOCARDIAL INFARCTION: ICD-10-CM

## 2023-11-03 DIAGNOSIS — Z95.5 HISTORY OF CORONARY ARTERY STENT PLACEMENT: ICD-10-CM

## 2023-11-03 DIAGNOSIS — E78.00 PURE HYPERCHOLESTEROLEMIA: ICD-10-CM

## 2023-11-03 DIAGNOSIS — I10 ESSENTIAL (PRIMARY) HYPERTENSION: ICD-10-CM

## 2023-11-03 DIAGNOSIS — I25.10 ATHEROSCLEROSIS OF NATIVE CORONARY ARTERY OF NATIVE HEART WITHOUT ANGINA PECTORIS: Primary | ICD-10-CM

## 2023-11-03 PROCEDURE — 1036F TOBACCO NON-USER: CPT | Performed by: INTERNAL MEDICINE

## 2023-11-03 PROCEDURE — G8427 DOCREV CUR MEDS BY ELIG CLIN: HCPCS | Performed by: INTERNAL MEDICINE

## 2023-11-03 PROCEDURE — 3017F COLORECTAL CA SCREEN DOC REV: CPT | Performed by: INTERNAL MEDICINE

## 2023-11-03 PROCEDURE — 1123F ACP DISCUSS/DSCN MKR DOCD: CPT | Performed by: INTERNAL MEDICINE

## 2023-11-03 PROCEDURE — 3078F DIAST BP <80 MM HG: CPT | Performed by: INTERNAL MEDICINE

## 2023-11-03 PROCEDURE — G8419 CALC BMI OUT NRM PARAM NOF/U: HCPCS | Performed by: INTERNAL MEDICINE

## 2023-11-03 PROCEDURE — 99214 OFFICE O/P EST MOD 30 MIN: CPT | Performed by: INTERNAL MEDICINE

## 2023-11-03 PROCEDURE — G8484 FLU IMMUNIZE NO ADMIN: HCPCS | Performed by: INTERNAL MEDICINE

## 2023-11-03 PROCEDURE — 3077F SYST BP >= 140 MM HG: CPT | Performed by: INTERNAL MEDICINE

## 2023-11-03 PROCEDURE — 93000 ELECTROCARDIOGRAM COMPLETE: CPT | Performed by: INTERNAL MEDICINE

## 2023-11-03 RX ORDER — ASPIRIN 325 MG
162.5 TABLET ORAL DAILY
Qty: 100 TABLET | COMMUNITY
Start: 2023-11-03

## 2023-11-03 RX ORDER — AMLODIPINE BESYLATE AND BENAZEPRIL HYDROCHLORIDE 10; 20 MG/1; MG/1
1 CAPSULE ORAL DAILY
Qty: 90 CAPSULE | Refills: 3 | Status: SHIPPED | OUTPATIENT
Start: 2023-11-03

## 2023-11-03 ASSESSMENT — ENCOUNTER SYMPTOMS
EYES NEGATIVE: 1
RESPIRATORY NEGATIVE: 1
GASTROINTESTINAL NEGATIVE: 1

## 2023-11-03 NOTE — PROGRESS NOTES
Room 8    1. Have you been to the ER, urgent care clinic since your last visit? Hospitalized since your last visit?     no      2. Where do you normally have your labs drawn? [facility]    3. Do you need any refills today?   no    4. Which local pharmacy do you use (enter pharmacy)? 5. Which mail order pharmacy do you use (enter pharmacy)? [facility]     6. Are you here for surgical clearance and if so who will be doing your     procedure/surgery (care team)?    Clearance for work

## 2023-11-06 DIAGNOSIS — I10 ESSENTIAL (PRIMARY) HYPERTENSION: ICD-10-CM

## 2023-11-06 RX ORDER — AMLODIPINE BESYLATE AND BENAZEPRIL HYDROCHLORIDE 10; 20 MG/1; MG/1
1 CAPSULE ORAL DAILY
Qty: 90 CAPSULE | Refills: 1 | Status: SHIPPED | OUTPATIENT
Start: 2023-11-06

## 2024-10-12 DIAGNOSIS — I10 ESSENTIAL (PRIMARY) HYPERTENSION: ICD-10-CM

## 2024-10-14 RX ORDER — AMLODIPINE AND BENAZEPRIL HYDROCHLORIDE 10; 20 MG/1; MG/1
1 CAPSULE ORAL DAILY
Qty: 90 CAPSULE | Refills: 3 | Status: SHIPPED | OUTPATIENT
Start: 2024-10-14

## 2024-12-20 ENCOUNTER — OFFICE VISIT (OUTPATIENT)
Age: 74
End: 2024-12-20
Payer: MEDICARE

## 2024-12-20 VITALS
WEIGHT: 245.6 LBS | BODY MASS INDEX: 30.54 KG/M2 | DIASTOLIC BLOOD PRESSURE: 79 MMHG | HEIGHT: 75 IN | HEART RATE: 77 BPM | SYSTOLIC BLOOD PRESSURE: 159 MMHG

## 2024-12-20 DIAGNOSIS — I25.2 OLD MYOCARDIAL INFARCTION: ICD-10-CM

## 2024-12-20 DIAGNOSIS — Z95.5 HISTORY OF CORONARY ARTERY STENT PLACEMENT: ICD-10-CM

## 2024-12-20 DIAGNOSIS — E78.5 DYSLIPIDEMIA: ICD-10-CM

## 2024-12-20 DIAGNOSIS — I10 ESSENTIAL HYPERTENSION, BENIGN: ICD-10-CM

## 2024-12-20 DIAGNOSIS — I25.10 ATHEROSCLEROSIS OF NATIVE CORONARY ARTERY OF NATIVE HEART WITHOUT ANGINA PECTORIS: Primary | ICD-10-CM

## 2024-12-20 PROCEDURE — G8484 FLU IMMUNIZE NO ADMIN: HCPCS | Performed by: INTERNAL MEDICINE

## 2024-12-20 PROCEDURE — 3078F DIAST BP <80 MM HG: CPT | Performed by: INTERNAL MEDICINE

## 2024-12-20 PROCEDURE — 1160F RVW MEDS BY RX/DR IN RCRD: CPT | Performed by: INTERNAL MEDICINE

## 2024-12-20 PROCEDURE — 1123F ACP DISCUSS/DSCN MKR DOCD: CPT | Performed by: INTERNAL MEDICINE

## 2024-12-20 PROCEDURE — 3017F COLORECTAL CA SCREEN DOC REV: CPT | Performed by: INTERNAL MEDICINE

## 2024-12-20 PROCEDURE — 1159F MED LIST DOCD IN RCRD: CPT | Performed by: INTERNAL MEDICINE

## 2024-12-20 PROCEDURE — 1036F TOBACCO NON-USER: CPT | Performed by: INTERNAL MEDICINE

## 2024-12-20 PROCEDURE — G8427 DOCREV CUR MEDS BY ELIG CLIN: HCPCS | Performed by: INTERNAL MEDICINE

## 2024-12-20 PROCEDURE — G8417 CALC BMI ABV UP PARAM F/U: HCPCS | Performed by: INTERNAL MEDICINE

## 2024-12-20 PROCEDURE — 99214 OFFICE O/P EST MOD 30 MIN: CPT | Performed by: INTERNAL MEDICINE

## 2024-12-20 PROCEDURE — 3077F SYST BP >= 140 MM HG: CPT | Performed by: INTERNAL MEDICINE

## 2024-12-20 RX ORDER — LISINOPRIL 40 MG/1
40 TABLET ORAL DAILY
Qty: 90 TABLET | Refills: 1 | Status: SHIPPED | OUTPATIENT
Start: 2024-12-20

## 2024-12-20 RX ORDER — AMLODIPINE BESYLATE 10 MG/1
10 TABLET ORAL DAILY
Qty: 90 TABLET | Refills: 0 | Status: SHIPPED | OUTPATIENT
Start: 2024-12-20

## 2024-12-20 RX ORDER — ROSUVASTATIN CALCIUM 10 MG/1
10 TABLET, COATED ORAL NIGHTLY
Qty: 90 TABLET | Refills: 1 | Status: SHIPPED | OUTPATIENT
Start: 2024-12-20

## 2024-12-20 ASSESSMENT — ENCOUNTER SYMPTOMS
GASTROINTESTINAL NEGATIVE: 1
RESPIRATORY NEGATIVE: 1
EYES NEGATIVE: 1

## 2024-12-20 NOTE — PROGRESS NOTES
advice given    Medications Discontinued During This Encounter   Medication Reason    aspirin 325 MG tablet Therapy completed    APPLE CIDER VINEGAR PO LIST CLEANUP       Follow-up and Dispositions    Return in about 3 months (around 3/20/2025), or if symptoms worsen or fail to improve, for BP log x 3-5 days post med changes, post test/procedure.           Return to ER if any significant CP not relieved by s/l NTG, severe SOB, severe palpitations, loss of consciousness    12/20/2024  Plan for medicines : Blood pressure is elevated.  Will change Lotrel to 2 separate medicines as it is expensive also for him.  Start amlodipine 10 mg a day and lisinopril 40 mg a day.  He is not getting any statins and last LDL was 94 which is not at goal.  Apparently some reaction to Lipitor.  Will try low-dose of Crestor at 10 mg a day.  Patient warned about any reaction.  He should watch for it and report as needed.  If he tolerates, and lab in 6 weeks for LFTs and lipid profile.  Check BP chart at home.  Exercise Plan: Regular activity and exercising 30 to 40 minutes a day.  Diet plan: Mediterranean diet guidelines reinforced.

## 2024-12-27 DIAGNOSIS — I10 ESSENTIAL (PRIMARY) HYPERTENSION: ICD-10-CM

## 2024-12-27 RX ORDER — AMLODIPINE AND BENAZEPRIL HYDROCHLORIDE 10; 20 MG/1; MG/1
1 CAPSULE ORAL DAILY
Qty: 90 CAPSULE | Refills: 3 | Status: SHIPPED | OUTPATIENT
Start: 2024-12-27

## 2025-04-21 ENCOUNTER — OFFICE VISIT (OUTPATIENT)
Age: 75
End: 2025-04-21
Payer: MEDICARE

## 2025-04-21 VITALS
WEIGHT: 244 LBS | HEART RATE: 80 BPM | BODY MASS INDEX: 29.71 KG/M2 | HEIGHT: 76 IN | OXYGEN SATURATION: 97 % | DIASTOLIC BLOOD PRESSURE: 66 MMHG | SYSTOLIC BLOOD PRESSURE: 134 MMHG

## 2025-04-21 DIAGNOSIS — Z95.5 HISTORY OF CORONARY ARTERY STENT PLACEMENT: ICD-10-CM

## 2025-04-21 DIAGNOSIS — E78.00 PURE HYPERCHOLESTEROLEMIA: ICD-10-CM

## 2025-04-21 DIAGNOSIS — I25.2 OLD MYOCARDIAL INFARCTION: ICD-10-CM

## 2025-04-21 DIAGNOSIS — I10 ESSENTIAL (PRIMARY) HYPERTENSION: ICD-10-CM

## 2025-04-21 DIAGNOSIS — I25.10 ATHEROSCLEROSIS OF NATIVE CORONARY ARTERY OF NATIVE HEART WITHOUT ANGINA PECTORIS: Primary | ICD-10-CM

## 2025-04-21 PROCEDURE — 1125F AMNT PAIN NOTED PAIN PRSNT: CPT | Performed by: INTERNAL MEDICINE

## 2025-04-21 PROCEDURE — G8417 CALC BMI ABV UP PARAM F/U: HCPCS | Performed by: INTERNAL MEDICINE

## 2025-04-21 PROCEDURE — 1036F TOBACCO NON-USER: CPT | Performed by: INTERNAL MEDICINE

## 2025-04-21 PROCEDURE — G8427 DOCREV CUR MEDS BY ELIG CLIN: HCPCS | Performed by: INTERNAL MEDICINE

## 2025-04-21 PROCEDURE — 1159F MED LIST DOCD IN RCRD: CPT | Performed by: INTERNAL MEDICINE

## 2025-04-21 PROCEDURE — 1160F RVW MEDS BY RX/DR IN RCRD: CPT | Performed by: INTERNAL MEDICINE

## 2025-04-21 PROCEDURE — 3078F DIAST BP <80 MM HG: CPT | Performed by: INTERNAL MEDICINE

## 2025-04-21 PROCEDURE — 1123F ACP DISCUSS/DSCN MKR DOCD: CPT | Performed by: INTERNAL MEDICINE

## 2025-04-21 PROCEDURE — 99214 OFFICE O/P EST MOD 30 MIN: CPT | Performed by: INTERNAL MEDICINE

## 2025-04-21 PROCEDURE — 3017F COLORECTAL CA SCREEN DOC REV: CPT | Performed by: INTERNAL MEDICINE

## 2025-04-21 PROCEDURE — 3075F SYST BP GE 130 - 139MM HG: CPT | Performed by: INTERNAL MEDICINE

## 2025-04-21 PROCEDURE — 93000 ELECTROCARDIOGRAM COMPLETE: CPT | Performed by: INTERNAL MEDICINE

## 2025-04-21 RX ORDER — AMLODIPINE BESYLATE 10 MG/1
10 TABLET ORAL DAILY
Qty: 90 TABLET | Refills: 0
Start: 2025-04-21

## 2025-04-21 ASSESSMENT — ENCOUNTER SYMPTOMS
EYES NEGATIVE: 1
GASTROINTESTINAL NEGATIVE: 1
RESPIRATORY NEGATIVE: 1

## 2025-04-21 ASSESSMENT — PATIENT HEALTH QUESTIONNAIRE - PHQ9
2. FEELING DOWN, DEPRESSED OR HOPELESS: NOT AT ALL
SUM OF ALL RESPONSES TO PHQ QUESTIONS 1-9: 0
1. LITTLE INTEREST OR PLEASURE IN DOING THINGS: NOT AT ALL
SUM OF ALL RESPONSES TO PHQ QUESTIONS 1-9: 0

## 2025-04-21 NOTE — PROGRESS NOTES
1. Have you been to the ER, urgent care clinic since your last visit?  Hospitalized since your last visit?     No      2.  Where do you normally have your labs drawn?   [facility]    3. Do you need any refills today?   No    4. Which local pharmacy do you use (enter pharmacy)?   [facility]    5. Which mail order pharmacy do you use (enter pharmacy)?   No     6. Are you here for surgical clearance and if so who will be doing your     procedure/surgery (care team)?   No      
07/08/24 06/30/23 07/26/22 04/26/21 02/11/20 03/20/19   Cholesterol 163 88 Low  104 Low  169 133 173   Triglyceride 111 52 62 59 118 156 High    HDL 46 40 50 64 38 Low  43   Cholesterol/HDL 3.5 2.2 2.1 2.6 3.5 4   Non-HDL Cholesterol 117 48 54 105 95 --   LDL CALCULATION 94 38 Low  42 Low  93 72 99   VLDL CALCULATION 22 10 12 12 24 31 High    LDL/HDL Ratio 2.1 1 0.8 1.5 1.9 --       8/18 patient losing weight successfully very well.    CAD: PCI in 2001 approx    9/2019  Stable CAD. B/P is controlled with current regimen.  Lipid panel in 3.19 with LDL 99. He is now taking Atorvastatin 40 mg consistently. Will repeat lipids and increase Atorvastatin if needed for control.  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    10/2020   Stable CAD and controlled b/p.  Recent LDL 72, continue Atorvastatin.    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  Blood pressure is controlled.  Somehow he is missing statins.  Represcribed Lipitor and ordered the labs.  Healthy lifestyle discussed.  Mediterranean diet guidelines printed.    6/30/2022 CAD is clinically stable.  Blood pressure mildly elevated initially.  Repeat blood pressure is excellent at 120/61  Check labs as ordered.  Diabetes is not controlled and this was brought to patient's attention.  Told him to lose weight to 225 pounds goal.  Healthy diets discussed and Mediterranean diet guidelines given.      2/9/2023 CAD is clinically stable.  Blood pressure is elevated.  Increase the dose of Lotrel to 5/40 and follow the home chart.  Lipids are controlled well as of 7/22  Healthy habits discussed.  Encouraged to walk regularly.  He may consider to join a yoga.  Mediterranean diet guidelines given.    11/3/2023  Plan for medicines : Blood pressure is mildly elevated.  Increase the dose of Lotrel and follow the home chart.  Lipids are excellent.  Congratulated that he lost weight.  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

## 2025-06-15 DIAGNOSIS — I10 ESSENTIAL HYPERTENSION, BENIGN: ICD-10-CM

## 2025-06-15 DIAGNOSIS — E78.5 DYSLIPIDEMIA: ICD-10-CM

## 2025-06-16 RX ORDER — ROSUVASTATIN CALCIUM 10 MG/1
10 TABLET, COATED ORAL NIGHTLY
Qty: 90 TABLET | Refills: 3 | Status: SHIPPED | OUTPATIENT
Start: 2025-06-16

## 2025-06-16 RX ORDER — LISINOPRIL 40 MG/1
40 TABLET ORAL DAILY
Qty: 90 TABLET | Refills: 3 | Status: SHIPPED | OUTPATIENT
Start: 2025-06-16